# Patient Record
Sex: FEMALE | Race: WHITE | NOT HISPANIC OR LATINO | Employment: OTHER | URBAN - METROPOLITAN AREA
[De-identification: names, ages, dates, MRNs, and addresses within clinical notes are randomized per-mention and may not be internally consistent; named-entity substitution may affect disease eponyms.]

---

## 2023-02-16 PROBLEM — R10.2 PELVIC PAIN: Status: ACTIVE | Noted: 2023-02-16

## 2023-02-16 PROBLEM — R07.81 RIB PAIN ON RIGHT SIDE: Status: ACTIVE | Noted: 2023-02-16

## 2023-02-16 PROBLEM — K13.79 ORAL DYSPLASIA, ACQUIRED: Status: ACTIVE | Noted: 2023-02-16

## 2023-02-16 PROBLEM — K21.9 GERD WITHOUT ESOPHAGITIS: Status: ACTIVE | Noted: 2023-02-16

## 2023-02-16 PROBLEM — R91.1 INCIDENTAL LUNG NODULE, > 3MM AND < 8MM: Status: ACTIVE | Noted: 2023-02-16

## 2023-02-16 PROBLEM — E78.5 HYPERLIPIDEMIA: Status: ACTIVE | Noted: 2023-02-16

## 2023-02-16 PROBLEM — R11.0 NAUSEA: Status: ACTIVE | Noted: 2023-02-16

## 2023-02-16 PROBLEM — R10.9 RIGHT FLANK PAIN: Status: ACTIVE | Noted: 2023-02-16

## 2023-02-16 PROBLEM — R35.0 URINARY FREQUENCY: Status: ACTIVE | Noted: 2023-02-16

## 2023-02-16 PROBLEM — N83.2 CYST OF OVARY: Status: ACTIVE | Noted: 2023-02-16

## 2023-02-16 PROBLEM — R68.89 FLU-LIKE SYMPTOMS: Status: ACTIVE | Noted: 2023-02-16

## 2023-02-16 PROBLEM — R93.89 ULTRASOUND SCAN ABNORMAL: Status: ACTIVE | Noted: 2023-02-16

## 2023-02-16 PROBLEM — S32.020A COMPRESSION FRACTURE OF L2 LUMBAR VERTEBRA (MULTI): Status: ACTIVE | Noted: 2023-02-16

## 2023-02-16 PROBLEM — M51.36 DEGENERATIVE DISC DISEASE, LUMBAR: Status: ACTIVE | Noted: 2023-02-16

## 2023-02-16 PROBLEM — R42 DIZZINESS: Status: ACTIVE | Noted: 2023-02-16

## 2023-02-16 PROBLEM — N94.9 ADNEXAL CYST: Status: ACTIVE | Noted: 2023-02-16

## 2023-02-16 PROBLEM — L98.8 FACIAL RHYTIDS: Status: ACTIVE | Noted: 2023-02-16

## 2023-02-16 PROBLEM — M51.369 DEGENERATIVE DISC DISEASE, LUMBAR: Status: ACTIVE | Noted: 2023-02-16

## 2023-02-16 PROBLEM — J01.90 ACUTE SINUSITIS: Status: ACTIVE | Noted: 2023-02-16

## 2023-02-16 PROBLEM — S00.93XA HEAD CONTUSION: Status: ACTIVE | Noted: 2023-02-16

## 2023-02-16 PROBLEM — S69.90XA WRIST INJURY: Status: ACTIVE | Noted: 2023-02-16

## 2023-02-16 PROBLEM — M54.2 NECK PAIN: Status: ACTIVE | Noted: 2023-02-16

## 2023-02-16 PROBLEM — E03.9 HYPOTHYROIDISM: Status: ACTIVE | Noted: 2023-02-16

## 2023-02-16 PROBLEM — N39.0 ACUTE UTI: Status: ACTIVE | Noted: 2023-02-16

## 2023-02-16 PROBLEM — I73.00 RAYNAUD PHENOMENON: Status: ACTIVE | Noted: 2023-02-16

## 2023-02-16 PROBLEM — M79.641 PAIN IN BOTH HANDS: Status: ACTIVE | Noted: 2023-02-16

## 2023-02-16 PROBLEM — R10.30 LOWER ABDOMINAL PAIN: Status: ACTIVE | Noted: 2023-02-16

## 2023-02-16 PROBLEM — F41.9 ANXIETY: Status: ACTIVE | Noted: 2023-02-16

## 2023-02-16 PROBLEM — R07.89 ATYPICAL CHEST PAIN: Status: ACTIVE | Noted: 2023-02-16

## 2023-02-16 PROBLEM — H91.90 HEARING LOSS: Status: ACTIVE | Noted: 2023-02-16

## 2023-02-16 PROBLEM — D22.9 MULTIPLE NEVI: Status: ACTIVE | Noted: 2023-02-16

## 2023-02-16 PROBLEM — M79.642 PAIN IN BOTH HANDS: Status: ACTIVE | Noted: 2023-02-16

## 2023-02-16 PROBLEM — S99.921A INJURY OF RIGHT FOOT: Status: ACTIVE | Noted: 2023-02-16

## 2023-02-16 PROBLEM — K59.00 CONSTIPATION: Status: ACTIVE | Noted: 2023-02-16

## 2023-02-16 PROBLEM — N83.209 CYST OF OVARY: Status: ACTIVE | Noted: 2023-02-16

## 2023-02-16 PROBLEM — J02.9 SORE THROAT: Status: ACTIVE | Noted: 2023-02-16

## 2023-02-16 PROBLEM — L02.01: Status: ACTIVE | Noted: 2023-02-16

## 2023-02-16 PROBLEM — M54.50 LOWER BACK PAIN: Status: ACTIVE | Noted: 2023-02-16

## 2023-02-16 PROBLEM — M81.0 OSTEOPOROSIS: Status: ACTIVE | Noted: 2023-02-16

## 2023-02-16 PROBLEM — K63.5 COLON POLYP: Status: ACTIVE | Noted: 2023-02-16

## 2023-02-16 PROBLEM — R51.9 HEADACHE: Status: ACTIVE | Noted: 2023-02-16

## 2023-02-16 PROBLEM — J06.9 ACUTE UPPER RESPIRATORY INFECTION: Status: ACTIVE | Noted: 2023-02-16

## 2023-02-16 RX ORDER — BISACODYL 5 MG
1 TABLET, DELAYED RELEASE (ENTERIC COATED) ORAL DAILY PRN
COMMUNITY

## 2023-02-16 RX ORDER — LEVOTHYROXINE SODIUM 25 UG/1
1 TABLET ORAL DAILY
COMMUNITY
Start: 2015-04-30 | End: 2023-03-29 | Stop reason: SDUPTHER

## 2023-02-16 RX ORDER — POLYETHYLENE GLYCOL 3350 17 G/17G
POWDER, FOR SOLUTION ORAL DAILY
COMMUNITY
Start: 2013-12-02

## 2023-02-16 RX ORDER — METHOCARBAMOL 500 MG/1
1 TABLET, FILM COATED ORAL 3 TIMES DAILY PRN
COMMUNITY
End: 2023-10-03 | Stop reason: SDUPTHER

## 2023-02-16 RX ORDER — MELOXICAM 15 MG/1
15 TABLET ORAL DAILY
COMMUNITY
End: 2023-10-20 | Stop reason: SDUPTHER

## 2023-02-16 RX ORDER — SERTRALINE HYDROCHLORIDE 25 MG/1
1 TABLET, FILM COATED ORAL DAILY
COMMUNITY
Start: 2018-05-20 | End: 2023-03-29 | Stop reason: SDUPTHER

## 2023-02-16 RX ORDER — HYDROCODONE BITARTRATE AND ACETAMINOPHEN 5; 325 MG/1; MG/1
1 TABLET ORAL 3 TIMES DAILY
COMMUNITY
End: 2023-10-03 | Stop reason: ALTCHOICE

## 2023-02-16 RX ORDER — IBANDRONATE SODIUM 150 MG/1
1 TABLET, FILM COATED ORAL
COMMUNITY
End: 2023-03-29 | Stop reason: SDUPTHER

## 2023-03-29 ENCOUNTER — OFFICE VISIT (OUTPATIENT)
Dept: PRIMARY CARE | Facility: CLINIC | Age: 78
End: 2023-03-29
Payer: MEDICARE

## 2023-03-29 VITALS
BODY MASS INDEX: 21.38 KG/M2 | SYSTOLIC BLOOD PRESSURE: 120 MMHG | HEART RATE: 75 BPM | DIASTOLIC BLOOD PRESSURE: 86 MMHG | TEMPERATURE: 97.7 F | WEIGHT: 116.2 LBS | HEIGHT: 62 IN | OXYGEN SATURATION: 96 %

## 2023-03-29 DIAGNOSIS — S32.020D COMPRESSION FRACTURE OF L2 VERTEBRA WITH ROUTINE HEALING, SUBSEQUENT ENCOUNTER: Primary | ICD-10-CM

## 2023-03-29 DIAGNOSIS — E03.9 ACQUIRED HYPOTHYROIDISM: ICD-10-CM

## 2023-03-29 DIAGNOSIS — M81.0 OSTEOPOROSIS, UNSPECIFIED OSTEOPOROSIS TYPE, UNSPECIFIED PATHOLOGICAL FRACTURE PRESENCE: ICD-10-CM

## 2023-03-29 DIAGNOSIS — M51.36 DEGENERATIVE DISC DISEASE, LUMBAR: ICD-10-CM

## 2023-03-29 DIAGNOSIS — F41.9 ANXIETY: ICD-10-CM

## 2023-03-29 PROCEDURE — 1036F TOBACCO NON-USER: CPT | Performed by: INTERNAL MEDICINE

## 2023-03-29 PROCEDURE — 1159F MED LIST DOCD IN RCRD: CPT | Performed by: INTERNAL MEDICINE

## 2023-03-29 PROCEDURE — 99214 OFFICE O/P EST MOD 30 MIN: CPT | Performed by: INTERNAL MEDICINE

## 2023-03-29 PROCEDURE — 1160F RVW MEDS BY RX/DR IN RCRD: CPT | Performed by: INTERNAL MEDICINE

## 2023-03-29 RX ORDER — IBANDRONATE SODIUM 150 MG/1
150 TABLET, FILM COATED ORAL
Qty: 1 TABLET | Refills: 3 | Status: SHIPPED | OUTPATIENT
Start: 2023-03-29 | End: 2023-10-05 | Stop reason: ALTCHOICE

## 2023-03-29 RX ORDER — LEVOTHYROXINE SODIUM 25 UG/1
25 TABLET ORAL DAILY
Qty: 90 TABLET | Refills: 3 | Status: SHIPPED | OUTPATIENT
Start: 2023-03-29 | End: 2024-01-12 | Stop reason: SDUPTHER

## 2023-03-29 RX ORDER — GABAPENTIN 100 MG/1
100 CAPSULE ORAL 3 TIMES DAILY
Qty: 90 CAPSULE | Refills: 1 | Status: SHIPPED | OUTPATIENT
Start: 2023-03-29 | End: 2023-10-03

## 2023-03-29 RX ORDER — SERTRALINE HYDROCHLORIDE 25 MG/1
25 TABLET, FILM COATED ORAL DAILY
Qty: 90 TABLET | Refills: 3 | Status: SHIPPED | OUTPATIENT
Start: 2023-03-29 | End: 2023-10-03 | Stop reason: ALTCHOICE

## 2023-03-29 ASSESSMENT — PATIENT HEALTH QUESTIONNAIRE - PHQ9
1. LITTLE INTEREST OR PLEASURE IN DOING THINGS: NOT AT ALL
2. FEELING DOWN, DEPRESSED OR HOPELESS: NOT AT ALL
SUM OF ALL RESPONSES TO PHQ9 QUESTIONS 1 AND 2: 0

## 2023-03-29 NOTE — PROGRESS NOTES
Patient is here for a follow up. Patient says that she is still experiencing pain from broken vertebrae and would like to know if any testing is needed. Patient also would like to discuss osteoporosis medication options. Lastly, patient would like to discuss a new cholesterol medication since stopping the last one.

## 2023-03-29 NOTE — PROGRESS NOTES
"Subjective   Patient ID: Zoraida Mejia is a 77 y.o. female who presents for Follow-up, Discuss cholesterol medication, Continuous pain from broken vertebrae, and Discuss osteoporsis medication options .    Here because of recurrence and worsening of her LBP with radiation to both legs posterior aspect.  She had history of multilevel degenerative disc disease lumbar spine and L2 vertebral compression fracture few months ago,seen by [facility],ortho and pain management.  No urinary symptoms or incontinence.no legs weakness.       she is here to discuss her osteoporosis,was on Bisphosphonate for 5 years then on drug holiday,her DEXA showed more bone loss,she refused to start Prolia due to high cost and co pay,she is currently taking  Bisphosphonate.    she has ,HLD, depression,anxiety, hypothyroid,osteoporosis,tubular adenoma,GERD,h/o ovarian cyst,h/o kidney stone .taking her meds regularly,no side effects.                  Review of Systems   Reason unable to perform ROS: as HPI.       Objective   /86 (BP Location: Left arm, Patient Position: Sitting, BP Cuff Size: Adult)   Pulse 75   Temp 36.5 °C (97.7 °F) (Temporal)   Ht 1.568 m (5' 1.75\")   Wt 52.7 kg (116 lb 3.2 oz)   SpO2 96%   BMI 21.43 kg/m²     Physical Exam  Constitutional:       Appearance: Normal appearance.   HENT:      Head: Normocephalic and atraumatic.   Eyes:      Extraocular Movements: Extraocular movements intact.      Pupils: Pupils are equal, round, and reactive to light.   Cardiovascular:      Rate and Rhythm: Normal rate and regular rhythm.      Heart sounds: Normal heart sounds.   Pulmonary:      Effort: Pulmonary effort is normal.      Breath sounds: Normal breath sounds. No wheezing or rhonchi.   Abdominal:      General: Abdomen is flat. Bowel sounds are normal. There is no distension.      Palpations: Abdomen is soft.   Musculoskeletal:      Cervical back: Normal range of motion and neck supple.      Left lower leg: No edema.      " Comments: Pain over lower L spine and paraspinal area ,positive straight leg raising.   Skin:     General: Skin is warm.   Neurological:      General: No focal deficit present.      Mental Status: She is alert and oriented to person, place, and time.   Psychiatric:         Mood and Affect: Mood normal.         Behavior: Behavior normal.         Assessment/Plan   Problem List Items Addressed This Visit          Musculoskeletal    Osteoporosis    Relevant Medications    ibandronate (Boniva) 150 mg tablet    Other Relevant Orders    Referral to Rheumatology    Referral to Rheumatology    Compression fracture of L2 lumbar vertebra (CMS/HCC) - Primary    Relevant Orders    XR lumbar spine complete 4+ views    Referral to Rheumatology    Degenerative disc disease, lumbar    Relevant Medications    gabapentin (Neurontin) 100 mg capsule       Endocrine/Metabolic    Hypothyroidism    Relevant Medications    levothyroxine (Synthroid, Levoxyl) 25 mcg tablet       Other    Anxiety    Relevant Medications    sertraline (Zoloft) 25 mg tablet

## 2023-03-30 NOTE — PATIENT INSTRUCTIONS
Refer to Dr Higgins for osteoporosis treatment.  Refer back to orthopedic,pain management.  Order L spine XRAT to further assess her worsening pain.  Follow up in 3 months.  Continue othe rmeds as before.  Continuecalcium 600 mg twice daily,vitamin D3 1000 IU daily and follow weight bearing exercise for osteoporosis and fall prevention.

## 2023-05-03 LAB
CALCIDIOL (25 OH VITAMIN D3) (NG/ML) IN SER/PLAS: 34 NG/ML
RHEUMATOID FACTOR (IU/ML) IN SERUM OR PLASMA: <10 IU/ML (ref 0–15)
SEDIMENTATION RATE, ERYTHROCYTE: 5 MM/H (ref 0–30)

## 2023-05-04 LAB — ANTI-NUCLEAR ANTIBODY (ANA): NEGATIVE

## 2023-05-05 LAB
ALBUMIN ELP: 4.5 G/DL (ref 3.4–5)
ALPHA 1: 0.3 G/DL (ref 0.2–0.6)
ALPHA 2: 0.8 G/DL (ref 0.4–1.1)
BETA: 0.8 G/DL (ref 0.5–1.2)
GAMMA GLOBULIN: 1.3 G/DL (ref 0.5–1.4)
PATH REVIEW-SERUM PROTEIN ELECTROPHORESIS: NORMAL
PROTEIN ELECTROPHORESIS INTERPRETATION: NORMAL
PROTEIN TOTAL: 7.8 G/DL (ref 6.4–8.2)

## 2023-05-08 LAB — CITRULLINE ANTIBODY, IGG: 4 UNITS (ref 0–19)

## 2023-06-01 ENCOUNTER — HOSPITAL ENCOUNTER (OUTPATIENT)
Dept: DATA CONVERSION | Facility: HOSPITAL | Age: 78
End: 2023-06-01
Attending: PAIN MEDICINE | Admitting: PAIN MEDICINE
Payer: MEDICARE

## 2023-06-01 DIAGNOSIS — M54.16 RADICULOPATHY, LUMBAR REGION: ICD-10-CM

## 2023-06-01 LAB
ALANINE AMINOTRANSFERASE (SGPT) (U/L) IN SER/PLAS: 14 U/L (ref 7–45)
ALBUMIN (G/DL) IN SER/PLAS: 4.1 G/DL (ref 3.4–5)
ALKALINE PHOSPHATASE (U/L) IN SER/PLAS: 77 U/L (ref 33–136)
ANION GAP IN SER/PLAS: 12 MMOL/L (ref 10–20)
ASPARTATE AMINOTRANSFERASE (SGOT) (U/L) IN SER/PLAS: 20 U/L (ref 9–39)
BILIRUBIN TOTAL (MG/DL) IN SER/PLAS: 0.5 MG/DL (ref 0–1.2)
CALCIUM (MG/DL) IN SER/PLAS: 8.9 MG/DL (ref 8.6–10.3)
CARBON DIOXIDE, TOTAL (MMOL/L) IN SER/PLAS: 28 MMOL/L (ref 21–32)
CHLORIDE (MMOL/L) IN SER/PLAS: 102 MMOL/L (ref 98–107)
CREATININE (MG/DL) IN SER/PLAS: 0.87 MG/DL (ref 0.5–1.05)
GFR FEMALE: 68 ML/MIN/1.73M2
GLUCOSE (MG/DL) IN SER/PLAS: 77 MG/DL (ref 74–99)
POTASSIUM (MMOL/L) IN SER/PLAS: 4.4 MMOL/L (ref 3.5–5.3)
PROTEIN TOTAL: 7.3 G/DL (ref 6.4–8.2)
SODIUM (MMOL/L) IN SER/PLAS: 138 MMOL/L (ref 136–145)
UREA NITROGEN (MG/DL) IN SER/PLAS: 19 MG/DL (ref 6–23)

## 2023-07-19 PROBLEM — M25.50 ARTHRALGIA: Status: ACTIVE | Noted: 2023-07-19

## 2023-07-19 PROBLEM — M25.551 RIGHT HIP PAIN: Status: ACTIVE | Noted: 2023-07-19

## 2023-07-19 PROBLEM — M54.9 BACK PAIN: Status: ACTIVE | Noted: 2023-07-19

## 2023-07-19 PROBLEM — M54.16 LUMBAR RADICULAR PAIN: Status: ACTIVE | Noted: 2023-07-19

## 2023-07-19 PROBLEM — R10.9 RT FLANK PAIN: Status: ACTIVE | Noted: 2023-07-19

## 2023-07-19 PROBLEM — N83.299 OTHER OVARIAN CYST, UNSPECIFIED SIDE: Status: ACTIVE | Noted: 2023-07-19

## 2023-08-02 ENCOUNTER — APPOINTMENT (OUTPATIENT)
Dept: PRIMARY CARE | Facility: CLINIC | Age: 78
End: 2023-08-02
Payer: MEDICARE

## 2023-09-06 ENCOUNTER — TELEPHONE (OUTPATIENT)
Dept: PRIMARY CARE | Facility: CLINIC | Age: 78
End: 2023-09-06
Payer: MEDICARE

## 2023-09-06 DIAGNOSIS — K21.9 GERD WITHOUT ESOPHAGITIS: ICD-10-CM

## 2023-09-06 DIAGNOSIS — E03.9 ACQUIRED HYPOTHYROIDISM: ICD-10-CM

## 2023-09-06 DIAGNOSIS — E78.49 OTHER HYPERLIPIDEMIA: Primary | ICD-10-CM

## 2023-09-20 ENCOUNTER — LAB (OUTPATIENT)
Dept: LAB | Facility: LAB | Age: 78
End: 2023-09-20
Payer: MEDICARE

## 2023-09-20 DIAGNOSIS — E78.49 OTHER HYPERLIPIDEMIA: ICD-10-CM

## 2023-09-20 DIAGNOSIS — E03.9 ACQUIRED HYPOTHYROIDISM: ICD-10-CM

## 2023-09-20 DIAGNOSIS — K21.9 GERD WITHOUT ESOPHAGITIS: ICD-10-CM

## 2023-09-20 LAB
ALANINE AMINOTRANSFERASE (SGPT) (U/L) IN SER/PLAS: 18 U/L (ref 7–45)
ALBUMIN (G/DL) IN SER/PLAS: 4.2 G/DL (ref 3.4–5)
ALKALINE PHOSPHATASE (U/L) IN SER/PLAS: 68 U/L (ref 33–136)
ANION GAP IN SER/PLAS: 11 MMOL/L (ref 10–20)
ASPARTATE AMINOTRANSFERASE (SGOT) (U/L) IN SER/PLAS: 22 U/L (ref 9–39)
BASOPHILS (10*3/UL) IN BLOOD BY AUTOMATED COUNT: 0.05 X10E9/L (ref 0–0.1)
BASOPHILS/100 LEUKOCYTES IN BLOOD BY AUTOMATED COUNT: 0.6 % (ref 0–2)
BILIRUBIN TOTAL (MG/DL) IN SER/PLAS: 0.5 MG/DL (ref 0–1.2)
CALCIUM (MG/DL) IN SER/PLAS: 8.9 MG/DL (ref 8.6–10.3)
CARBON DIOXIDE, TOTAL (MMOL/L) IN SER/PLAS: 30 MMOL/L (ref 21–32)
CHLORIDE (MMOL/L) IN SER/PLAS: 102 MMOL/L (ref 98–107)
CHOLESTEROL (MG/DL) IN SER/PLAS: 239 MG/DL (ref 0–199)
CHOLESTEROL IN HDL (MG/DL) IN SER/PLAS: 71 MG/DL
CHOLESTEROL/HDL RATIO: 3.4
CREATININE (MG/DL) IN SER/PLAS: 0.84 MG/DL (ref 0.5–1.05)
EOSINOPHILS (10*3/UL) IN BLOOD BY AUTOMATED COUNT: 0.41 X10E9/L (ref 0–0.4)
EOSINOPHILS/100 LEUKOCYTES IN BLOOD BY AUTOMATED COUNT: 5.1 % (ref 0–6)
ERYTHROCYTE DISTRIBUTION WIDTH (RATIO) BY AUTOMATED COUNT: 13.6 % (ref 11.5–14.5)
ERYTHROCYTE MEAN CORPUSCULAR HEMOGLOBIN CONCENTRATION (G/DL) BY AUTOMATED: 32.5 G/DL (ref 32–36)
ERYTHROCYTE MEAN CORPUSCULAR VOLUME (FL) BY AUTOMATED COUNT: 94 FL (ref 80–100)
ERYTHROCYTES (10*6/UL) IN BLOOD BY AUTOMATED COUNT: 4.43 X10E12/L (ref 4–5.2)
GFR FEMALE: 71 ML/MIN/1.73M2
GLUCOSE (MG/DL) IN SER/PLAS: 95 MG/DL (ref 74–99)
HEMATOCRIT (%) IN BLOOD BY AUTOMATED COUNT: 41.6 % (ref 36–46)
HEMOGLOBIN (G/DL) IN BLOOD: 13.5 G/DL (ref 12–16)
IMMATURE GRANULOCYTES/100 LEUKOCYTES IN BLOOD BY AUTOMATED COUNT: 0.3 % (ref 0–0.9)
LDL: 138 MG/DL (ref 0–99)
LEUKOCYTES (10*3/UL) IN BLOOD BY AUTOMATED COUNT: 8 X10E9/L (ref 4.4–11.3)
LYMPHOCYTES (10*3/UL) IN BLOOD BY AUTOMATED COUNT: 3.81 X10E9/L (ref 0.8–3)
LYMPHOCYTES/100 LEUKOCYTES IN BLOOD BY AUTOMATED COUNT: 47.8 % (ref 13–44)
MONOCYTES (10*3/UL) IN BLOOD BY AUTOMATED COUNT: 0.59 X10E9/L (ref 0.05–0.8)
MONOCYTES/100 LEUKOCYTES IN BLOOD BY AUTOMATED COUNT: 7.4 % (ref 2–10)
NEUTROPHILS (10*3/UL) IN BLOOD BY AUTOMATED COUNT: 3.09 X10E9/L (ref 1.6–5.5)
NEUTROPHILS/100 LEUKOCYTES IN BLOOD BY AUTOMATED COUNT: 38.8 % (ref 40–80)
PLATELETS (10*3/UL) IN BLOOD AUTOMATED COUNT: 362 X10E9/L (ref 150–450)
POTASSIUM (MMOL/L) IN SER/PLAS: 4 MMOL/L (ref 3.5–5.3)
PROTEIN TOTAL: 7.4 G/DL (ref 6.4–8.2)
SODIUM (MMOL/L) IN SER/PLAS: 139 MMOL/L (ref 136–145)
THYROTROPIN (MIU/L) IN SER/PLAS BY DETECTION LIMIT <= 0.05 MIU/L: 1.5 MIU/L (ref 0.44–3.98)
TRIGLYCERIDE (MG/DL) IN SER/PLAS: 150 MG/DL (ref 0–149)
UREA NITROGEN (MG/DL) IN SER/PLAS: 16 MG/DL (ref 6–23)
VLDL: 30 MG/DL (ref 0–40)

## 2023-09-20 PROCEDURE — 85025 COMPLETE CBC W/AUTO DIFF WBC: CPT

## 2023-09-20 PROCEDURE — 80053 COMPREHEN METABOLIC PANEL: CPT

## 2023-09-20 PROCEDURE — 80061 LIPID PANEL: CPT

## 2023-09-20 PROCEDURE — 36415 COLL VENOUS BLD VENIPUNCTURE: CPT

## 2023-09-20 PROCEDURE — 84443 ASSAY THYROID STIM HORMONE: CPT

## 2023-09-21 NOTE — RESULT ENCOUNTER NOTE
Lab show elevated cholesterol,rest of lab results within normal range,follow low fat diet and we will discuss at upcoming appointment.

## 2023-10-02 NOTE — OP NOTE
PROCEDURE DETAILS      Postoperative Diagnosis:  Lumbar radiculopathy  Surgeon: Juju Huffman  Resident/Fellow/Other Assistant: Fara Alonso    Procedure:  Lumbar epidural steroid injection under fluoroscopic guidance     Anesthesia: No anesthesiologist associated with this case  Estimated Blood Loss: 0  Findings: None         Operative Report:       Operation  Midline lumbar epidural steroid injection. Level performed L5-S1    Surgical indications  The patient is here today to receive an epidural steroid injection to assist with pain.    Operative report  The patient was taken to the procedure room, was placed into a prone positioning. The lumbar area was prepped and draped sterilely in the normal fashion. Under fluoroscopic guidance the epidural space was identified. The skin and subcutaneous tissue was  anesthetized with 1% lidocaine. An 18-gauge Tuohy needle was introduced using the normal saline loss-of-resistance technique. Once the loss of resistance had been achieved, final positioning of the needle was confirmed with negative aspiration of heme  and CSF, with the injection of contrast material showing spread in the epidural space, confirmed in AP and lateral view. Then the patient received 80 mg of Depo-Medrol diluted into normal saline preservative-free and 2 mL of 0.25% bupivacaine making total  volume of 8 mL. The patient tolerated the procedure well, was taken to the recovery room, allowed to recover sufficient amount of time and then was discharged home in stable condition. The patient was advised to followup with [facility]  to reassess improvement on as-needed basis.    Thank you for allowing me to participate in the care of this patient.                        Attestation:   Note Completion:  Attending Attestation I performed the procedure without a resident         Electronic Signatures:  Juju Huffman)  (Signed 01-Jun-2023 10:59)   Authored: Post-Operative Note,  Chart Review, Note Completion      Last Updated: 01-Jun-2023 10:59 by Juju Huffman)

## 2023-10-03 ENCOUNTER — TELEPHONE (OUTPATIENT)
Dept: PRIMARY CARE | Facility: CLINIC | Age: 78
End: 2023-10-03

## 2023-10-03 ENCOUNTER — OFFICE VISIT (OUTPATIENT)
Dept: PRIMARY CARE | Facility: CLINIC | Age: 78
End: 2023-10-03
Payer: MEDICARE

## 2023-10-03 VITALS
HEIGHT: 61 IN | DIASTOLIC BLOOD PRESSURE: 90 MMHG | WEIGHT: 118.8 LBS | OXYGEN SATURATION: 97 % | BODY MASS INDEX: 22.43 KG/M2 | HEART RATE: 79 BPM | SYSTOLIC BLOOD PRESSURE: 148 MMHG | TEMPERATURE: 97.4 F | RESPIRATION RATE: 16 BRPM

## 2023-10-03 DIAGNOSIS — M54.41 CHRONIC RIGHT-SIDED LOW BACK PAIN WITH RIGHT-SIDED SCIATICA: ICD-10-CM

## 2023-10-03 DIAGNOSIS — K63.5 POLYP OF COLON, UNSPECIFIED PART OF COLON, UNSPECIFIED TYPE: ICD-10-CM

## 2023-10-03 DIAGNOSIS — E78.5 HYPERLIPIDEMIA, UNSPECIFIED HYPERLIPIDEMIA TYPE: ICD-10-CM

## 2023-10-03 DIAGNOSIS — Z23 VACCINE FOR DIPHTHERIA-TETANUS: ICD-10-CM

## 2023-10-03 DIAGNOSIS — F41.9 ANXIETY: ICD-10-CM

## 2023-10-03 DIAGNOSIS — Z00.00 ROUTINE GENERAL MEDICAL EXAMINATION AT HEALTH CARE FACILITY: Primary | ICD-10-CM

## 2023-10-03 DIAGNOSIS — G89.29 CHRONIC RIGHT-SIDED LOW BACK PAIN WITH RIGHT-SIDED SCIATICA: ICD-10-CM

## 2023-10-03 DIAGNOSIS — M54.2 NECK PAIN: Primary | ICD-10-CM

## 2023-10-03 DIAGNOSIS — K59.00 CONSTIPATION, UNSPECIFIED CONSTIPATION TYPE: ICD-10-CM

## 2023-10-03 DIAGNOSIS — Z00.00 MEDICARE ANNUAL WELLNESS VISIT, SUBSEQUENT: ICD-10-CM

## 2023-10-03 DIAGNOSIS — S91.302A WOUND OF LEFT FOOT: ICD-10-CM

## 2023-10-03 DIAGNOSIS — S91.332A PENETRATING FOOT WOUND, LEFT, INITIAL ENCOUNTER: ICD-10-CM

## 2023-10-03 PROBLEM — L90.5 SCAR CONDITION AND FIBROSIS OF SKIN: Status: ACTIVE | Noted: 2023-06-16

## 2023-10-03 PROBLEM — D22.5 MELANOCYTIC NEVI OF TRUNK: Status: ACTIVE | Noted: 2023-06-16

## 2023-10-03 PROBLEM — H90.3 BILATERAL HIGH FREQUENCY SENSORINEURAL HEARING LOSS: Status: ACTIVE | Noted: 2023-10-03

## 2023-10-03 PROBLEM — H61.22 IMPACTED CERUMEN OF LEFT EAR: Status: ACTIVE | Noted: 2023-10-03

## 2023-10-03 PROBLEM — H92.02 OTALGIA OF LEFT EAR: Status: ACTIVE | Noted: 2023-10-03

## 2023-10-03 PROBLEM — D22.39 MELANOCYTIC NEVI OF OTHER PARTS OF FACE: Status: ACTIVE | Noted: 2023-06-16

## 2023-10-03 PROBLEM — L82.1 OTHER SEBORRHEIC KERATOSIS: Status: ACTIVE | Noted: 2023-06-16

## 2023-10-03 PROBLEM — L81.4 OTHER MELANIN HYPERPIGMENTATION: Status: ACTIVE | Noted: 2023-06-16

## 2023-10-03 PROBLEM — D22.71 MELANOCYTIC NEVI OF RIGHT LOWER LIMB, INCLUDING HIP: Status: ACTIVE | Noted: 2023-06-16

## 2023-10-03 PROCEDURE — 90714 TD VACC NO PRESV 7 YRS+ IM: CPT | Performed by: INTERNAL MEDICINE

## 2023-10-03 PROCEDURE — 1159F MED LIST DOCD IN RCRD: CPT | Performed by: INTERNAL MEDICINE

## 2023-10-03 PROCEDURE — 93000 ELECTROCARDIOGRAM COMPLETE: CPT | Performed by: INTERNAL MEDICINE

## 2023-10-03 PROCEDURE — 99214 OFFICE O/P EST MOD 30 MIN: CPT | Performed by: INTERNAL MEDICINE

## 2023-10-03 PROCEDURE — 1125F AMNT PAIN NOTED PAIN PRSNT: CPT | Performed by: INTERNAL MEDICINE

## 2023-10-03 PROCEDURE — 1170F FXNL STATUS ASSESSED: CPT | Performed by: INTERNAL MEDICINE

## 2023-10-03 PROCEDURE — G0444 DEPRESSION SCREEN ANNUAL: HCPCS | Performed by: INTERNAL MEDICINE

## 2023-10-03 PROCEDURE — G0442 ANNUAL ALCOHOL SCREEN 15 MIN: HCPCS | Performed by: INTERNAL MEDICINE

## 2023-10-03 PROCEDURE — G0439 PPPS, SUBSEQ VISIT: HCPCS | Performed by: INTERNAL MEDICINE

## 2023-10-03 PROCEDURE — 90471 IMMUNIZATION ADMIN: CPT | Performed by: INTERNAL MEDICINE

## 2023-10-03 PROCEDURE — 1160F RVW MEDS BY RX/DR IN RCRD: CPT | Performed by: INTERNAL MEDICINE

## 2023-10-03 PROCEDURE — 1036F TOBACCO NON-USER: CPT | Performed by: INTERNAL MEDICINE

## 2023-10-03 RX ORDER — ROSUVASTATIN CALCIUM 5 MG/1
5 TABLET, COATED ORAL DAILY
Qty: 30 TABLET | Refills: 5 | Status: SHIPPED | OUTPATIENT
Start: 2023-10-03 | End: 2024-01-12 | Stop reason: SDUPTHER

## 2023-10-03 RX ORDER — CYCLOBENZAPRINE HCL 5 MG
5 TABLET ORAL NIGHTLY
COMMUNITY
End: 2023-10-03 | Stop reason: SDUPTHER

## 2023-10-03 RX ORDER — CYCLOBENZAPRINE HCL 5 MG
5 TABLET ORAL NIGHTLY
Qty: 30 TABLET | Refills: 2 | Status: SHIPPED | OUTPATIENT
Start: 2023-10-03 | End: 2023-12-26

## 2023-10-03 RX ORDER — METHOCARBAMOL 500 MG/1
500 TABLET, FILM COATED ORAL 3 TIMES DAILY PRN
Qty: 90 TABLET | Refills: 2 | Status: SHIPPED | OUTPATIENT
Start: 2023-10-03 | End: 2023-10-03

## 2023-10-03 RX ORDER — CYCLOBENZAPRINE HCL 5 MG
5 TABLET ORAL 3 TIMES DAILY PRN
Qty: 30 TABLET | Refills: 0 | Status: SHIPPED | OUTPATIENT
Start: 2023-10-03 | End: 2023-11-03

## 2023-10-03 RX ORDER — HYDROXYZINE HYDROCHLORIDE 25 MG/1
25 TABLET, FILM COATED ORAL 2 TIMES DAILY
Qty: 60 TABLET | Refills: 0 | Status: SHIPPED | OUTPATIENT
Start: 2023-10-03 | End: 2023-10-20

## 2023-10-03 ASSESSMENT — ENCOUNTER SYMPTOMS
GASTROINTESTINAL NEGATIVE: 1
EYES NEGATIVE: 1
DEPRESSION: 0
NERVOUS/ANXIOUS: 1
OCCASIONAL FEELINGS OF UNSTEADINESS: 0
RESPIRATORY NEGATIVE: 1
NEUROLOGICAL NEGATIVE: 1
LOSS OF SENSATION IN FEET: 0
BACK PAIN: 1
CONSTITUTIONAL NEGATIVE: 1
HEMATOLOGIC/LYMPHATIC NEGATIVE: 1
SLEEP DISTURBANCE: 1
CARDIOVASCULAR NEGATIVE: 1

## 2023-10-03 ASSESSMENT — ACTIVITIES OF DAILY LIVING (ADL)
DRESSING: INDEPENDENT
GROCERY_SHOPPING: INDEPENDENT
BATHING: INDEPENDENT
TAKING_MEDICATION: INDEPENDENT
DOING_HOUSEWORK: INDEPENDENT
MANAGING_FINANCES: INDEPENDENT

## 2023-10-03 ASSESSMENT — PATIENT HEALTH QUESTIONNAIRE - PHQ9
SUM OF ALL RESPONSES TO PHQ9 QUESTIONS 1 AND 2: 0
2. FEELING DOWN, DEPRESSED OR HOPELESS: NOT AT ALL
1. LITTLE INTEREST OR PLEASURE IN DOING THINGS: NOT AT ALL

## 2023-10-03 NOTE — TELEPHONE ENCOUNTER
Patient was seen this morning and says that she asked if you can prescribe her Cyclobenzaprine that was originally from another doctor but you renewed 2 other ones instead. Please prescribe for patient.  Patient uses the [facility] of [address].

## 2023-10-03 NOTE — PROGRESS NOTES
"Subjective   Patient ID: Zoraida Mejia is a 78 y.o. female who presents for Medicare Annual Wellness Visit Subsequent.    Here for MCR and follow up on medical problems.  she has ,HLD, depression,anxiety, hypothyroid,osteoporosis,tubular adenoma,GERD,h/o ovarian cyst,h/o kidney stone .taking her meds regularly,no side effects.She also has osteoporosis,was on Bisphosphonate for 5 years then on drug holiday,her DEXA showed more bone loss,she refused to start Prolia due to high cost and co pay,but she is currently on IV Reclast and was covered.  She has a wound on her leg from her garden.  She also has her usual  LBP with radiation to both legs posterior aspect.  She had history of multilevel degenerative disc disease lumbar spine and L2 vertebral compression fracture few months ago,seen by [facility],ortho and pain management.she saw ortho,pain management,,had 1 nerve block with some improvement but it came back and has an appt with Dr Masters.  No urinary symptoms or incontinence.no legs weakness.  She denies depression,she feels anxious,she has 1-2 beers per day.                  Review of Systems   Constitutional: Negative.    HENT: Negative.     Eyes: Negative.    Respiratory: Negative.     Cardiovascular: Negative.    Gastrointestinal: Negative.    Genitourinary: Negative.    Musculoskeletal:  Positive for back pain.   Skin:  Positive for wound.   Neurological: Negative.    Hematological: Negative.    Psychiatric/Behavioral:  Positive for sleep disturbance. The patient is nervous/anxious.        Objective   /90 (BP Location: Left arm, Patient Position: Sitting, BP Cuff Size: Adult)   Pulse 79   Temp 36.3 °C (97.4 °F)   Resp 16   Ht 1.549 m (5' 1\")   Wt 53.9 kg (118 lb 12.8 oz)   SpO2 97%   BMI 22.45 kg/m²     Physical Exam  Constitutional:       Appearance: Normal appearance.   HENT:      Head: Normocephalic and atraumatic.   Eyes:      Extraocular Movements: Extraocular movements intact.      Pupils: " Pupils are equal, round, and reactive to light.   Cardiovascular:      Rate and Rhythm: Normal rate and regular rhythm.      Heart sounds: Normal heart sounds.   Pulmonary:      Effort: Pulmonary effort is normal.      Breath sounds: Normal breath sounds. No wheezing or rhonchi.   Abdominal:      General: Abdomen is flat. Bowel sounds are normal. There is no distension.      Palpations: Abdomen is soft.   Musculoskeletal:         General: Normal range of motion.      Cervical back: Normal range of motion and neck supple.      Right lower leg: No edema.      Left lower leg: No edema.   Skin:     General: Skin is warm.      Comments: Lean abrasion left lower leg,no signs of infection.   Neurological:      General: No focal deficit present.      Mental Status: She is alert and oriented to person, place, and time.   Psychiatric:         Mood and Affect: Mood normal.         Behavior: Behavior normal.         Assessment/Plan   Problem List Items Addressed This Visit             ICD-10-CM    Anxiety F41.9     Start Hydroxyzine PRN.         Colon polyp K63.5    Relevant Orders    Colonoscopy Screening    Hyperlipidemia E78.5     Restart Crestor and recheck labs in 3 months.         Relevant Medications    hydrOXYzine HCL (Atarax) 25 mg tablet    rosuvastatin (Crestor) 5 mg tablet    Other Relevant Orders    ECG 12 lead (Clinic Performed) (Completed)    Lipid Panel    Hepatic Function Panel    Creatine Kinase    Lower back pain M54.50    Constipation K59.00     Has been on laxative with some response she did not respond to Miralax or fiber supplement.         Medicare annual wellness visit, subsequent Z00.00     EKG done today.  Fall prevention.  Order colonoscopy.         Wound of left foot S91.302A     Local wound care,update Td.         Relevant Orders    Td vaccine, age 7 years and older (TENIVAC) (Completed)     Other Visit Diagnoses         Codes    Routine general medical examination at health care facility    -   Primary Z00.00    Vaccine for diphtheria-tetanus     Z23    Penetrating foot wound, left, initial encounter     S91.332A    Relevant Orders    Td vaccine, age 7 years and older (TENIVAC) (Completed)

## 2023-10-05 ASSESSMENT — ENCOUNTER SYMPTOMS: WOUND: 1

## 2023-10-20 ENCOUNTER — TELEPHONE (OUTPATIENT)
Dept: PAIN MEDICINE | Facility: CLINIC | Age: 78
End: 2023-10-20
Payer: MEDICARE

## 2023-10-20 DIAGNOSIS — M54.50 CHRONIC LOW BACK PAIN, UNSPECIFIED BACK PAIN LATERALITY, UNSPECIFIED WHETHER SCIATICA PRESENT: Primary | ICD-10-CM

## 2023-10-20 DIAGNOSIS — G89.29 CHRONIC LOW BACK PAIN, UNSPECIFIED BACK PAIN LATERALITY, UNSPECIFIED WHETHER SCIATICA PRESENT: Primary | ICD-10-CM

## 2023-10-20 DIAGNOSIS — E78.5 HYPERLIPIDEMIA, UNSPECIFIED HYPERLIPIDEMIA TYPE: ICD-10-CM

## 2023-10-20 RX ORDER — MELOXICAM 15 MG/1
15 TABLET ORAL DAILY
Qty: 30 TABLET | Refills: 5 | Status: SHIPPED | OUTPATIENT
Start: 2023-10-20 | End: 2023-10-24 | Stop reason: SDUPTHER

## 2023-10-20 RX ORDER — HYDROXYZINE HYDROCHLORIDE 25 MG/1
25 TABLET, FILM COATED ORAL 2 TIMES DAILY
Qty: 60 TABLET | Refills: 0 | Status: SHIPPED | OUTPATIENT
Start: 2023-10-20 | End: 2023-10-25

## 2023-10-24 ENCOUNTER — PHARMACY VISIT (OUTPATIENT)
Dept: PHARMACY | Facility: CLINIC | Age: 78
End: 2023-10-24
Payer: MEDICARE

## 2023-10-24 DIAGNOSIS — M54.50 CHRONIC LOW BACK PAIN, UNSPECIFIED BACK PAIN LATERALITY, UNSPECIFIED WHETHER SCIATICA PRESENT: ICD-10-CM

## 2023-10-24 DIAGNOSIS — G89.29 CHRONIC LOW BACK PAIN, UNSPECIFIED BACK PAIN LATERALITY, UNSPECIFIED WHETHER SCIATICA PRESENT: ICD-10-CM

## 2023-10-24 RX ORDER — MELOXICAM 15 MG/1
15 TABLET ORAL DAILY
Qty: 30 TABLET | Refills: 5 | Status: SHIPPED | OUTPATIENT
Start: 2023-10-24 | End: 2024-01-12 | Stop reason: ALTCHOICE

## 2023-10-25 DIAGNOSIS — E78.5 HYPERLIPIDEMIA, UNSPECIFIED HYPERLIPIDEMIA TYPE: ICD-10-CM

## 2023-10-25 RX ORDER — HYDROXYZINE HYDROCHLORIDE 25 MG/1
25 TABLET, FILM COATED ORAL 2 TIMES DAILY
Qty: 180 TABLET | Refills: 1 | Status: SHIPPED | OUTPATIENT
Start: 2023-10-25 | End: 2024-04-15 | Stop reason: SDUPTHER

## 2023-11-01 ENCOUNTER — APPOINTMENT (OUTPATIENT)
Dept: ORTHOPEDIC SURGERY | Facility: CLINIC | Age: 78
End: 2023-11-01
Payer: MEDICARE

## 2023-11-03 ENCOUNTER — OFFICE VISIT (OUTPATIENT)
Dept: CARDIOLOGY | Facility: CLINIC | Age: 78
End: 2023-11-03
Payer: MEDICARE

## 2023-11-03 VITALS
BODY MASS INDEX: 22.09 KG/M2 | HEIGHT: 61 IN | OXYGEN SATURATION: 96 % | HEART RATE: 90 BPM | SYSTOLIC BLOOD PRESSURE: 149 MMHG | WEIGHT: 117 LBS | DIASTOLIC BLOOD PRESSURE: 89 MMHG

## 2023-11-03 DIAGNOSIS — I73.00 RAYNAUD'S PHENOMENON WITHOUT GANGRENE: Primary | ICD-10-CM

## 2023-11-03 DIAGNOSIS — I10 PRIMARY HYPERTENSION: ICD-10-CM

## 2023-11-03 PROBLEM — J01.90 ACUTE SINUSITIS: Status: RESOLVED | Noted: 2023-02-16 | Resolved: 2023-11-03

## 2023-11-03 PROBLEM — S99.921A INJURY OF RIGHT FOOT: Status: RESOLVED | Noted: 2023-02-16 | Resolved: 2023-11-03

## 2023-11-03 PROBLEM — M79.642 PAIN IN BOTH HANDS: Status: RESOLVED | Noted: 2023-02-16 | Resolved: 2023-11-03

## 2023-11-03 PROBLEM — S00.93XA HEAD CONTUSION: Status: RESOLVED | Noted: 2023-02-16 | Resolved: 2023-11-03

## 2023-11-03 PROBLEM — J02.9 SORE THROAT: Status: RESOLVED | Noted: 2023-02-16 | Resolved: 2023-11-03

## 2023-11-03 PROBLEM — R11.0 NAUSEA: Status: RESOLVED | Noted: 2023-02-16 | Resolved: 2023-11-03

## 2023-11-03 PROBLEM — S91.302A WOUND OF LEFT FOOT: Status: RESOLVED | Noted: 2023-10-03 | Resolved: 2023-11-03

## 2023-11-03 PROBLEM — R07.89 ATYPICAL CHEST PAIN: Status: RESOLVED | Noted: 2023-02-16 | Resolved: 2023-11-03

## 2023-11-03 PROBLEM — N39.0 ACUTE UTI: Status: RESOLVED | Noted: 2023-02-16 | Resolved: 2023-11-03

## 2023-11-03 PROBLEM — R42 DIZZINESS: Status: RESOLVED | Noted: 2023-02-16 | Resolved: 2023-11-03

## 2023-11-03 PROBLEM — M54.2 NECK PAIN: Status: RESOLVED | Noted: 2023-02-16 | Resolved: 2023-11-03

## 2023-11-03 PROBLEM — R68.89 FLU-LIKE SYMPTOMS: Status: RESOLVED | Noted: 2023-02-16 | Resolved: 2023-11-03

## 2023-11-03 PROBLEM — J06.9 ACUTE UPPER RESPIRATORY INFECTION: Status: RESOLVED | Noted: 2023-02-16 | Resolved: 2023-11-03

## 2023-11-03 PROBLEM — R35.0 URINARY FREQUENCY: Status: RESOLVED | Noted: 2023-02-16 | Resolved: 2023-11-03

## 2023-11-03 PROBLEM — L02.01: Status: RESOLVED | Noted: 2023-02-16 | Resolved: 2023-11-03

## 2023-11-03 PROBLEM — R93.89 ULTRASOUND SCAN ABNORMAL: Status: RESOLVED | Noted: 2023-02-16 | Resolved: 2023-11-03

## 2023-11-03 PROBLEM — R51.9 HEADACHE: Status: RESOLVED | Noted: 2023-02-16 | Resolved: 2023-11-03

## 2023-11-03 PROBLEM — M79.641 PAIN IN BOTH HANDS: Status: RESOLVED | Noted: 2023-02-16 | Resolved: 2023-11-03

## 2023-11-03 PROBLEM — M54.9 BACK PAIN: Status: RESOLVED | Noted: 2023-07-19 | Resolved: 2023-11-03

## 2023-11-03 PROBLEM — R07.81 RIB PAIN ON RIGHT SIDE: Status: RESOLVED | Noted: 2023-02-16 | Resolved: 2023-11-03

## 2023-11-03 PROBLEM — S69.90XA WRIST INJURY: Status: RESOLVED | Noted: 2023-02-16 | Resolved: 2023-11-03

## 2023-11-03 PROCEDURE — 1160F RVW MEDS BY RX/DR IN RCRD: CPT | Performed by: INTERNAL MEDICINE

## 2023-11-03 PROCEDURE — 1036F TOBACCO NON-USER: CPT | Performed by: INTERNAL MEDICINE

## 2023-11-03 PROCEDURE — 93005 ELECTROCARDIOGRAM TRACING: CPT | Mod: PO | Performed by: INTERNAL MEDICINE

## 2023-11-03 PROCEDURE — 99203 OFFICE O/P NEW LOW 30 MIN: CPT | Performed by: INTERNAL MEDICINE

## 2023-11-03 PROCEDURE — 1126F AMNT PAIN NOTED NONE PRSNT: CPT | Performed by: INTERNAL MEDICINE

## 2023-11-03 PROCEDURE — 93010 ELECTROCARDIOGRAM REPORT: CPT | Performed by: INTERNAL MEDICINE

## 2023-11-03 PROCEDURE — 99213 OFFICE O/P EST LOW 20 MIN: CPT | Mod: PO | Performed by: INTERNAL MEDICINE

## 2023-11-03 PROCEDURE — 3079F DIAST BP 80-89 MM HG: CPT | Performed by: INTERNAL MEDICINE

## 2023-11-03 PROCEDURE — 1159F MED LIST DOCD IN RCRD: CPT | Performed by: INTERNAL MEDICINE

## 2023-11-03 PROCEDURE — 3077F SYST BP >= 140 MM HG: CPT | Performed by: INTERNAL MEDICINE

## 2023-11-03 RX ORDER — LISINOPRIL 2.5 MG/1
2.5 TABLET ORAL DAILY
Qty: 30 TABLET | Refills: 11 | Status: SHIPPED | OUTPATIENT
Start: 2023-11-03 | End: 2024-01-12 | Stop reason: SDUPTHER

## 2023-11-03 ASSESSMENT — PAIN SCALES - GENERAL: PAINLEVEL: 0-NO PAIN

## 2023-11-03 ASSESSMENT — ENCOUNTER SYMPTOMS
DEPRESSION: 1
LOSS OF SENSATION IN FEET: 0
OCCASIONAL FEELINGS OF UNSTEADINESS: 0

## 2023-11-03 NOTE — PROGRESS NOTES
Name : Zoraida Mejia    : 1945   MRN : [id number]   ENC Date : 23     Reason for visit:   Hypertension:    Assessment and Plan:   Hypertension: Patient at the request of primary care physician was checking her blood pressure at home.  Many of her blood pressures are perfectly normal.  None of them are low.  However she did have several that were greater than 150 and a few greater than 160 systolic and a handful of diastolic pressures greater than 90 and 1 or 2 greater than 100.  She is asymptomatic.  I recommended given the number of blood pressures that are elevated that we add lisinopril 2.5 mg to her medical regimen.  I explained the risks of cough and angioedema.  She will follow-up with her primary care physician for further titration of her medications.  Dyslipidemia: Patient's lipids checked earlier this year were off rosuvastatin.  She was started back on rosuvastatin 5 mg daily.  She seems to be tolerating it reasonably well.  Her prior intolerance really was not related to the statin.  I explained that to her.  It is likely she will need 10 or 20 mg of rosuvastatin to get to goal.  This will be titrated by her primary care physician.  Cardiac risk: We considered a coronary calcium score for risk stratification.  However patient is already going to be on a statin so there is a little difference in decision making we would make based on her coronary calcium score.  She is asymptomatic so stress testing is not appropriate.  Therefore we decided not to order this test and simply go ahead with statin therapy.  Disp:  Return on an as-needed basis.      HPI:    Patient was seen today at her request due to elevated blood pressure.  Her primary care physician asked her to check her blood pressures at home and many of them are normal however several are elevated particularly in the evening hours.  A few diastolics are greater than 100.  Several systolic pressures are greater than 140.  Her daughter is  a pediatric oncology nurse and recommended she see cardiology quickly due to the elevated blood pressures.  Fortunately patient has no symptoms.  She has no chest discomfort.  No dyspnea with exertion.  No orthopnea nor PND.  No syncopal events.  She otherwise feels quite well.  She had 1 episode of possible rainout symptoms in the past.  Her fingertips turn blue.  This actually was not associated with exposure to cold.  She does not have any color changes during the winter or when she puts her hand in the refrigerator.  It is a little unclear whether she actually has Raynaud's.  I considered using amlodipine and I probably would have chosen this as she truly had significant Raynaud's but since she does not I elected to go with an ACE inhibitor for blood pressure control given the better vascular outcomes with ACE inhibitors.  It is possible she might need both at some point in the future.       Problem List:   Patient Active Problem List   Diagnosis    Anxiety    Colon polyp    Cyst of ovary    Facial rhytids    GERD without esophagitis    Hearing loss    Hyperlipidemia    Hypothyroidism    Incidental lung nodule, > 3mm and < 8mm    Lower back pain    Multiple nevi    Constipation    Oral dysplasia, acquired    Osteoporosis    Pelvic pain    Raynaud phenomenon    Right flank pain    Adnexal cyst    Compression fracture of L2 lumbar vertebra (CMS/HCC)    Degenerative disc disease, lumbar    Lower abdominal pain    Other ovarian cyst, unspecified side    Arthralgia    Lumbar radicular pain    Right hip pain    Rt flank pain    Bilateral high frequency sensorineural hearing loss    Impacted cerumen of left ear    Melanocytic nevi of right lower limb, including hip    Melanocytic nevi of trunk    Melanocytic nevi of other parts of face    Otalgia of left ear    Other melanin hyperpigmentation    Other seborrheic keratosis    Scar condition and fibrosis of skin    Medicare annual wellness visit, subsequent        Meds:    Current Outpatient Medications on File Prior to Visit   Medication Sig Dispense Refill    bisacodyl (Dulcolax) 5 mg EC tablet Take 1 tablet (5 mg) by mouth once daily as needed.      cyclobenzaprine (Flexeril) 5 mg tablet Take 1 tablet (5 mg) by mouth once daily at bedtime. 1 TABLET AT BEDTIME PRN FOR LOW BACK ACHES 30 tablet 2    hydrOXYzine HCL (Atarax) 25 mg tablet TAKE 1 TABLET BY MOUTH TWICE A  tablet 1    levothyroxine (Synthroid, Levoxyl) 25 mcg tablet Take 1 tablet (25 mcg) by mouth once daily. 90 tablet 3    meloxicam (Mobic) 15 mg tablet Take 1 tablet (15 mg) by mouth once daily. 30 tablet 5    polyethylene glycol (Glycolax) 17 gram/dose powder Take by mouth once daily. MIX 1 CAPFUL (17GM) IN 8 OUNCES OF WATER, JUICE, OR TEA AND DRINK DAILY.      rosuvastatin (Crestor) 5 mg tablet Take 1 tablet (5 mg) by mouth once daily. 30 tablet 5    [DISCONTINUED] cyclobenzaprine (Flexeril) 5 mg tablet Take 1 tablet (5 mg) by mouth 3 times a day as needed for muscle spasms. (Patient not taking: Reported on 11/3/2023) 30 tablet 0     No current facility-administered medications on file prior to visit.       All:   Allergies   Allergen Reactions    Rosuvastatin Other     Muscle aches  UTI        Fam Hx:   Family History   Problem Relation Name Age of Onset    Heart disease Mother          cardiac disorder       Soc Hx:   Social History     Socioeconomic History    Marital status:      Spouse name: Not on file    Number of children: 4    Years of education: Not on file    Highest education level: Not on file   Occupational History    Not on file   Tobacco Use    Smoking status: Never     Passive exposure: Past    Smokeless tobacco: Never   Vaping Use    Vaping Use: Never used   Substance and Sexual Activity    Alcohol use: Yes    Drug use: Never    Sexual activity: Not on file   Other Topics Concern    Not on file   Social History Narrative    Not on file     Social Determinants of Health     Financial  "Resource Strain: Not on file   Food Insecurity: Not on file   Transportation Needs: Not on file   Physical Activity: Not on file   Stress: Not on file   Social Connections: Not on file   Intimate Partner Violence: Not on file   Housing Stability: Not on file       ROS    VS: /89 (BP Location: Right arm, Patient Position: Sitting)   Pulse 90   Ht 1.549 m (5' 1\")   Wt 53.1 kg (117 lb)   SpO2 96%   BMI 22.11 kg/m²      Physical Exam  Vitals reviewed.   Constitutional:       Appearance: Normal appearance.   Eyes:      Pupils: Pupils are equal, round, and reactive to light.   Neck:      Vascular: No JVD.   Cardiovascular:      Rate and Rhythm: Normal rate and regular rhythm.      Pulses: Normal pulses.      Heart sounds: No murmur heard.     No gallop.   Pulmonary:      Effort: No respiratory distress.      Breath sounds: No wheezing or rales.   Abdominal:      General: Abdomen is flat. There is no distension.      Palpations: Abdomen is soft.   Musculoskeletal:         General: No swelling.      Right lower leg: No edema.      Left lower leg: No edema.   Neurological:      General: No focal deficit present.      Mental Status: She is alert.   Psychiatric:         Mood and Affect: Mood normal.          No results found for this or any previous visit from the past 1095 days.     No echocardiogram results found for the past 12 months  Encounter Date: 10/03/23   ECG 12 lead (Clinic Performed)    Narrative    Sinus rhythm.  Normal ECG.  HR 81 bpm.         ECG: Normal sinus rhythm.  Normal ECG.    Eusebio Mcclain MD   "

## 2023-11-07 ENCOUNTER — OFFICE VISIT (OUTPATIENT)
Dept: DERMATOLOGY | Facility: CLINIC | Age: 78
End: 2023-11-07

## 2023-11-07 DIAGNOSIS — L98.8 WRINKLES: Primary | ICD-10-CM

## 2023-11-07 LAB
ATRIAL RATE: 85 BPM
P AXIS: 31 DEGREES
P OFFSET: 198 MS
P ONSET: 158 MS
PR INTERVAL: 122 MS
Q ONSET: 219 MS
QRS COUNT: 14 BEATS
QRS DURATION: 76 MS
QT INTERVAL: 374 MS
QTC CALCULATION(BAZETT): 445 MS
QTC FREDERICIA: 420 MS
R AXIS: 41 DEGREES
T AXIS: 44 DEGREES
T OFFSET: 406 MS
VENTRICULAR RATE: 85 BPM

## 2023-11-07 PROCEDURE — 1159F MED LIST DOCD IN RCRD: CPT | Performed by: DERMATOLOGY

## 2023-11-07 PROCEDURE — 1160F RVW MEDS BY RX/DR IN RCRD: CPT | Performed by: DERMATOLOGY

## 2023-11-07 PROCEDURE — BOTOX BOTOX 1 UNIT: Performed by: DERMATOLOGY

## 2023-11-07 PROCEDURE — PBTXA ADMINISTRATION FEE COSMETIC: Performed by: DERMATOLOGY

## 2023-11-07 PROCEDURE — 1036F TOBACCO NON-USER: CPT | Performed by: DERMATOLOGY

## 2023-11-07 PROCEDURE — 1126F AMNT PAIN NOTED NONE PRSNT: CPT | Performed by: DERMATOLOGY

## 2023-11-07 ASSESSMENT — DERMATOLOGY PATIENT ASSESSMENT
DO YOU USE A TANNING BED: NO
DO YOU HAVE ANY NEW OR CHANGING LESIONS: NO
ARE YOU AN ORGAN TRANSPLANT RECIPIENT: NO
ARE YOU TRYING TO GET PREGNANT: NO
HAVE YOU HAD OR DO YOU HAVE VASCULAR DISEASE: NO
DO YOU HAVE IRREGULAR MENSTRUAL CYCLES: NO
DO YOU USE SUNSCREEN: OCCASIONALLY
HAVE YOU HAD OR DO YOU HAVE A STAPH INFECTION: NO
ARE YOU ON BIRTH CONTROL: NO

## 2023-11-07 ASSESSMENT — DERMATOLOGY QUALITY OF LIFE (QOL) ASSESSMENT
ARE THERE EXCLUSIONS OR EXCEPTIONS FOR THE QUALITY OF LIFE ASSESSMENT: NO
DATE THE QUALITY-OF-LIFE ASSESSMENT WAS COMPLETED: 66785
RATE HOW BOTHERED YOU ARE BY EFFECTS OF YOUR SKIN PROBLEMS ON YOUR ACTIVITIES (EG, GOING OUT, ACCOMPLISHING WHAT YOU WANT, WORK ACTIVITIES OR YOUR RELATIONSHIPS WITH OTHERS): 0 - NEVER BOTHERED
RATE HOW EMOTIONALLY BOTHERED YOU ARE BY YOUR SKIN PROBLEM (FOR EXAMPLE, WORRY, EMBARRASSMENT, FRUSTRATION): 0 - NEVER BOTHERED
RATE HOW BOTHERED YOU ARE BY SYMPTOMS OF YOUR SKIN PROBLEM (EG, ITCHING, STINGING BURNING, HURTING OR SKIN IRRITATION): 0 - NEVER BOTHERED
WHAT SINGLE SKIN CONDITION LISTED BELOW IS THE PATIENT ANSWERING THE QUALITY-OF-LIFE ASSESSMENT QUESTIONS ABOUT: NONE OF THE ABOVE

## 2023-11-07 ASSESSMENT — PATIENT GLOBAL ASSESSMENT (PGA): PATIENT GLOBAL ASSESSMENT: PATIENT GLOBAL ASSESSMENT:  1 - CLEAR

## 2023-11-07 ASSESSMENT — ITCH NUMERIC RATING SCALE: HOW SEVERE IS YOUR ITCHING?: 0

## 2023-11-28 ENCOUNTER — TELEPHONE (OUTPATIENT)
Dept: PAIN MEDICINE | Facility: CLINIC | Age: 78
End: 2023-11-28
Payer: MEDICARE

## 2023-11-28 NOTE — TELEPHONE ENCOUNTER
Called pt, [place] scheduled 12/1/23    ----- Message from Zoraida Mejia sent at 11/28/2023 10:14 AM EST -----  Regarding: Epidural steroid injection  Contact: [phone]  I had an epidural steroid injection June 1st and the effects are wearing off.  Is there any chance can get another injection before [place]?  I can be reached at home at  or cell phone     Thanks

## 2023-11-29 ENCOUNTER — TELEPHONE (OUTPATIENT)
Dept: PRIMARY CARE | Facility: CLINIC | Age: 78
End: 2023-11-29

## 2023-11-29 ENCOUNTER — APPOINTMENT (OUTPATIENT)
Dept: ORTHOPEDIC SURGERY | Facility: CLINIC | Age: 78
End: 2023-11-29
Payer: MEDICARE

## 2023-11-29 ENCOUNTER — LAB (OUTPATIENT)
Dept: LAB | Facility: LAB | Age: 78
End: 2023-11-29
Payer: MEDICARE

## 2023-11-29 DIAGNOSIS — E78.49 OTHER HYPERLIPIDEMIA: Primary | ICD-10-CM

## 2023-11-29 DIAGNOSIS — E78.49 OTHER HYPERLIPIDEMIA: ICD-10-CM

## 2023-11-29 LAB
ALBUMIN SERPL BCP-MCNC: 4.4 G/DL (ref 3.4–5)
ALP SERPL-CCNC: 84 U/L (ref 33–136)
ALT SERPL W P-5'-P-CCNC: 28 U/L (ref 7–45)
AST SERPL W P-5'-P-CCNC: 27 U/L (ref 9–39)
BILIRUB DIRECT SERPL-MCNC: 0.1 MG/DL (ref 0–0.3)
BILIRUB SERPL-MCNC: 0.6 MG/DL (ref 0–1.2)
CHOLEST SERPL-MCNC: 179 MG/DL (ref 0–199)
CHOLESTEROL/HDL RATIO: 2.5
CK SERPL-CCNC: 53 U/L (ref 0–215)
HDLC SERPL-MCNC: 72.7 MG/DL
LDLC SERPL CALC-MCNC: 87 MG/DL
NON HDL CHOLESTEROL: 106 MG/DL (ref 0–149)
PROT SERPL-MCNC: 7.6 G/DL (ref 6.4–8.2)
TRIGL SERPL-MCNC: 98 MG/DL (ref 0–149)
VLDL: 20 MG/DL (ref 0–40)

## 2023-11-29 PROCEDURE — 36415 COLL VENOUS BLD VENIPUNCTURE: CPT

## 2023-11-29 PROCEDURE — 80061 LIPID PANEL: CPT

## 2023-11-29 PROCEDURE — 80076 HEPATIC FUNCTION PANEL: CPT

## 2023-11-29 PROCEDURE — 82550 ASSAY OF CK (CPK): CPT

## 2023-11-29 NOTE — TELEPHONE ENCOUNTER
Pt is in her car she has an appt on Monday and thought she was supposed to get blood work. Pt was told by the lab that the order can't be done until January. Please call ASAP on order if it should be done?

## 2023-12-01 ENCOUNTER — OFFICE VISIT (OUTPATIENT)
Dept: PAIN MEDICINE | Facility: CLINIC | Age: 78
End: 2023-12-01
Payer: MEDICARE

## 2023-12-01 VITALS
HEART RATE: 90 BPM | RESPIRATION RATE: 20 BRPM | DIASTOLIC BLOOD PRESSURE: 74 MMHG | SYSTOLIC BLOOD PRESSURE: 134 MMHG | TEMPERATURE: 97 F

## 2023-12-01 DIAGNOSIS — M54.16 LUMBAR RADICULAR PAIN: Primary | ICD-10-CM

## 2023-12-01 PROCEDURE — 1036F TOBACCO NON-USER: CPT | Performed by: PHYSICIAN ASSISTANT

## 2023-12-01 PROCEDURE — 1160F RVW MEDS BY RX/DR IN RCRD: CPT | Performed by: PHYSICIAN ASSISTANT

## 2023-12-01 PROCEDURE — 3078F DIAST BP <80 MM HG: CPT | Performed by: PHYSICIAN ASSISTANT

## 2023-12-01 PROCEDURE — 1159F MED LIST DOCD IN RCRD: CPT | Performed by: PHYSICIAN ASSISTANT

## 2023-12-01 PROCEDURE — 3075F SYST BP GE 130 - 139MM HG: CPT | Performed by: PHYSICIAN ASSISTANT

## 2023-12-01 PROCEDURE — 1126F AMNT PAIN NOTED NONE PRSNT: CPT | Performed by: PHYSICIAN ASSISTANT

## 2023-12-01 PROCEDURE — 1157F ADVNC CARE PLAN IN RCRD: CPT | Performed by: PHYSICIAN ASSISTANT

## 2023-12-01 PROCEDURE — 99214 OFFICE O/P EST MOD 30 MIN: CPT | Mod: ZK | Performed by: PHYSICIAN ASSISTANT

## 2023-12-01 PROCEDURE — 99204 OFFICE O/P NEW MOD 45 MIN: CPT | Performed by: PHYSICIAN ASSISTANT

## 2023-12-01 RX ORDER — METHYLPREDNISOLONE 4 MG/1
TABLET ORAL
Qty: 21 TABLET | Refills: 0 | Status: SHIPPED | OUTPATIENT
Start: 2023-12-01 | End: 2023-12-04 | Stop reason: ALTCHOICE

## 2023-12-01 ASSESSMENT — COLUMBIA-SUICIDE SEVERITY RATING SCALE - C-SSRS
1. IN THE PAST MONTH, HAVE YOU WISHED YOU WERE DEAD OR WISHED YOU COULD GO TO SLEEP AND NOT WAKE UP?: NO
2. HAVE YOU ACTUALLY HAD ANY THOUGHTS OF KILLING YOURSELF?: NO
6. HAVE YOU EVER DONE ANYTHING, STARTED TO DO ANYTHING, OR PREPARED TO DO ANYTHING TO END YOUR LIFE?: NO

## 2023-12-01 ASSESSMENT — PATIENT HEALTH QUESTIONNAIRE - PHQ9
2. FEELING DOWN, DEPRESSED OR HOPELESS: NOT AT ALL
SUM OF ALL RESPONSES TO PHQ9 QUESTIONS 1 AND 2: 0
1. LITTLE INTEREST OR PLEASURE IN DOING THINGS: NOT AT ALL

## 2023-12-01 ASSESSMENT — PAIN SCALES - GENERAL: PAINLEVEL_OUTOF10: 4

## 2023-12-01 ASSESSMENT — PAIN - FUNCTIONAL ASSESSMENT: PAIN_FUNCTIONAL_ASSESSMENT: 0-10

## 2023-12-01 ASSESSMENT — PAIN DESCRIPTION - DESCRIPTORS: DESCRIPTORS: ACHING;TINGLING

## 2023-12-01 NOTE — PROGRESS NOTES
"  Subjective   Patient ID: Zoraida Mejia is a 78 y.o. female who presents for Back Pain. Is here for back pain and wants to discuss possible epidural injection.    HPI  Denies any new health changes     She was started on lisinopril 2.5mg QD on 11/3, also she went back to taking a statin, eyes are blurrier, she is talking to her pcp about it     Had a lumbar epidural in June 2023, got 85-90% relief    Her pain started to return approximately 6 weeks ago    \"It's not anywhere near what it was last time\"     She aches     If she walks or does anything for a few minutes, she can't straighten her back and has to sit     She noticed when the weather started to change, and gets colder, her pain has been worse     Hx kyphoplasty    Saw orthopedic spine surgeon Dr Clemens, and was told to see a physical rehabilitation medicine specialist for 12/27/23    Has new hearing aids     Pain is primarily in her right lower back with some radiation to the right thoracic region and down the right buttock and to the back of the right thigh; sometimes shoots to right foot     Occasional numbness in the toes of her right foot     She takes ibuprofen as needed for pain     She tried gabapentin and she didn't like it, \"I didn't see any relief,\" and she didn't like the idea of raising the doses     She sees a rheumatologist and she gets flexeril; it helps her sleep    She is going to try the heating pad    Complains of constipation     Physical therapy helped her     Patient denies any new onset of weakness in the lower extremity or upper extremity or any new bladder or bowel dysfunction.      Review of Systems    All 13 systems were reviewed and are within normal levels except as noted below or per HPI. Positive and pertinent negative responses are noted below or in the HPI   Denied any fever or chills. No weight loss and no night sweats. No cough or sputum production. No diarrhea   No constipation  No bladder and bowel incontinence " and no other changes in bladder and bowel. No skin changes.   Denied opioids diversion and abuse and denies alcoholism. Denies overuse of pain medications.      Past medical history  interval changes as noted in hpi    Objective   Physical Exam       General:   Alert, oriented, pleasant, and cooperative. Does not appear to be in any major distress.     HEENT:   Pupils normal in size. Ears, nose, mouth, and throat appear to be in normal condition. Head atraumatic.   No signs of sedation or withdrawal apparent.    Psychiatric:   No signs of depression apparent.     Neuro:   No focal neurological deficit apparent. Ambulation at baseline.     Respiratory:   Normal respiratory distress     Abdomen:   No distention     Skin:   No skin markings supportive of recent IV drug use.     Cardiovascular:   Regular rate.        MRN: [id number]  Patient Name: CODIE WEEMS     STUDY:  MRI L-SPINE WO;  5/8/2023 3:15 pm     INDICATION:  PAIN  M54.50: Lower back pain M51.36: Degenerative disc disease,  lumbar S32.020A: Compression fracture of L2 lumbar vertebra PT HAS LT  ANKLE SINCE 2010.     COMPARISON:  12/28/2022 limited exam of the lumbar spine on CT of the abdomen     ACCESSION NUMBER(S):  [id number]     ORDERING CLINICIAN:  DARSHANA PALAFOX     TECHNIQUE:  Sagittal T1, T2, STIR, axial T1 and T2 weighted images of the lumbar  spine were acquired.     FINDINGS:  There is persistent moderate anterior wedging of the L2 vertebral  body with 5 mm of retropulsion without significant change compared  with the prior CT. Minimal T1 hypointensity and T2 stir  hyperintensity along the inferior endplate of the L1 vertebral body  may be due to recent Schmorl's node formation and or type 1 Modic  discogenic degenerative change. There is mild levoconvex scoliosis of  the lumbar spine with apex at the L4 level. No other significant  abnormality of height, alignment, or signal of the lumbar vertebral  bodies. The conus medullaris terminates  appropriately at  approximately the L1-2 level.     T12-L1:  There is no significant central canal or neural foraminal  stenosis.     L1-2:  Retropulsed bony material with superimposed central herniation  moderately indents the ventral aspect of the central canal without  impinging upon the conus medullaris with potential irritation however  of the traversing nerve roots including the traversing L2 nerve  roots. There is minimal bilateral foraminal stenosis due to  underlying disc bulge and hypertrophic facet change.     L2-3:  Disc bulge minimally indents the ventral thecal sac and  minimally narrows the neuroforamina.     L3-4:  Disc bulge minimally indents the ventral thecal sac and  minimally narrows the neuroforamina.     L4-5:  Posterior endplate osteophytes and disc bulge combine with  hypertrophic facet changes and ligamentum flavum hypertrophy to  partially efface the subarticular zones on both sides and minimally  to moderately narrow the right and minimally narrow the left  neuroforamina.     L5-S1:  Disc bulge minimally indents the ventral thecal sac and  combines with hypertrophic facet changes and ligamentum flavum  hypertrophy to partially efface the left greater than right  subarticular zone and minimally narrow the neuroforamina.     IMPRESSION:  Findings of chronic compression fracture at L2 without significant  change identified compared with 12/28/2022 abdominal CT and with  retropulsion of fragments at this level indenting the ventral thecal  sac and possibly irritating the traversing L2 nerve roots are not  well discretely visualized. Additional findings including  degenerative changes as above.    Assessment/Plan   Diagnoses and all orders for this visit:  Lumbar radicular pain  -     Schedule appointment; Future  -     Epidural Steroid Injection; Future       Plan:   Failed therapy with NSAIDs and physical therapy     Schedule a Midline lumbar epidural steroid injection. Level performed L5-S1      Medrol Dose Pack     Follow up on an as needed basis in 3 months or sooner if needed       [place] dated TODAY  reviewed. No signs or abuse or misuse of prescribed medication.    Advised to continue following the guidelines to prevent the spread of COVID19  1. Frequent handwashing.  2. Avoiding touching face.  3. Sneeze or cough into a tissue or inside your elbow.  4. Disinfect frequently used items and surfaces as much as possible.  5. Wear a mask around people.  6. If you feel sick stay home.  7. Practice social distancing.  8. Avoid discretionary traveling, shopping trips, and social visits    Assessment/Plan:. The most applicable treatment options considered and discussed with the patient at this time, including a combination of physical therapy approaches, psychological/psychiatric approaches, pharmacologic management, interventional procedures and pain management surgeries (such as spinal cord stimulation and intrathecal pump placement. Risk of complications and/or morbidity and mortality is high given the acute and chronic pain may pose a threat to life and bodily functions if undertreated, poorly treated or with failure to maintain adequate and timely follow-up. Given the serious and fluctuating nature of pain (with extensive considerations whenever pain changes, worsens and even if it improves), there always remains the possibility of prolonged functional impairment requiring constant patient re-assessment and high level medical decision making. The amount and complexity of data reviewed is high given the patient labs, radiology reports and other tests were obtained and reviewed (summarized as applicable). Pertinent positive and negative findings were considered in medical decision making.   The patient was counseled regarding diagnostic results, instructions for management, risk factor reductions, prognosis, patient and family education, impressions, risks and benefits of treatment options and importance  of compliance with treatment.    The level of clinical decision making in this office visit, is high, given the high risks of complications with the morbidity and mortality due to the fact that acute and chronic pain may pose a threat to the life and bodily function, if under treated, poorly treated, or with failure to maintain adequate treatment and timely medical follow up. Additionally over treatment has its own set of complications including overdosing on the pain medications and also the habit forming effects of the medications used to treat chronic painful conditions including therapeutic classes classified as dangerous medications. Given the serious and fluctuating nature of pain with extensive consideration for whenever pain changes, there is always the risk of prolonged functional impairment requiring close patient assessment and reassessment and high level medical decision making at every office visit. The amount and complexity of data reviewed is high given the patient clinical presentation, labs,  data, radiology reports, and other tests as above. Pertinent data whether positive or negative were taken in consideration in the process of making this high level medical decision.     The patient was counseled regarding risk factor reductions, prognosis, patient and family education, impressions, risks and benefits of treatment options and importance of compliance with treatment.     Total amount of time spent with patient was 30 minutes with more than 50% of the time devoted to history taking physical examination face-to-face time and coordination of care  Thank you for allowing me to participate in the care of this patient    Plan was explained to patient, and the patient verbalized understanding and agreement with the plan.           *Please note this report has been produced using speech recognition software and may contain errors related to that system including grammar, punctuation and spelling as  well as words and phrases that may be inappropriate. If there are questions or concerns, please feel free to contact me to clarify.

## 2023-12-01 NOTE — PATIENT INSTRUCTIONS
Follow up on an as needed basis  in 3 months or sooner if needed    May take tylenol as needed for pain with meloxicam     Do not exceed 3000mg of tylenol per day    Avoid other Non-steroidal anti-inflammatory medications (Advil, aleve, naproxen, ibuprofen, motrin, aspirin, naprosyn)    If you decide to take the medrol dose pack, hold your meloxicam while you are on it, may resume meloxicam after you complete the steroid pack     Please do not hesitate to contact the pain clinic after your visit with any questions or concerns at  M-F 8-4 pm

## 2023-12-04 ENCOUNTER — OFFICE VISIT (OUTPATIENT)
Dept: PRIMARY CARE | Facility: CLINIC | Age: 78
End: 2023-12-04
Payer: MEDICARE

## 2023-12-04 VITALS
SYSTOLIC BLOOD PRESSURE: 125 MMHG | DIASTOLIC BLOOD PRESSURE: 86 MMHG | HEART RATE: 93 BPM | WEIGHT: 120.8 LBS | BODY MASS INDEX: 22.81 KG/M2 | TEMPERATURE: 97.5 F | OXYGEN SATURATION: 100 % | HEIGHT: 61 IN

## 2023-12-04 DIAGNOSIS — E78.49 OTHER HYPERLIPIDEMIA: ICD-10-CM

## 2023-12-04 DIAGNOSIS — M80.00XD OSTEOPOROSIS WITH CURRENT PATHOLOGICAL FRACTURE WITH ROUTINE HEALING, UNSPECIFIED OSTEOPOROSIS TYPE, SUBSEQUENT ENCOUNTER: ICD-10-CM

## 2023-12-04 DIAGNOSIS — F41.9 ANXIETY: Primary | ICD-10-CM

## 2023-12-04 DIAGNOSIS — I10 PRIMARY HYPERTENSION: ICD-10-CM

## 2023-12-04 DIAGNOSIS — E03.9 ACQUIRED HYPOTHYROIDISM: ICD-10-CM

## 2023-12-04 PROCEDURE — 3079F DIAST BP 80-89 MM HG: CPT | Performed by: INTERNAL MEDICINE

## 2023-12-04 PROCEDURE — 3074F SYST BP LT 130 MM HG: CPT | Performed by: INTERNAL MEDICINE

## 2023-12-04 PROCEDURE — 1036F TOBACCO NON-USER: CPT | Performed by: INTERNAL MEDICINE

## 2023-12-04 PROCEDURE — 1126F AMNT PAIN NOTED NONE PRSNT: CPT | Performed by: INTERNAL MEDICINE

## 2023-12-04 PROCEDURE — 99214 OFFICE O/P EST MOD 30 MIN: CPT | Performed by: INTERNAL MEDICINE

## 2023-12-04 PROCEDURE — 1160F RVW MEDS BY RX/DR IN RCRD: CPT | Performed by: INTERNAL MEDICINE

## 2023-12-04 PROCEDURE — 1159F MED LIST DOCD IN RCRD: CPT | Performed by: INTERNAL MEDICINE

## 2023-12-04 RX ORDER — SERTRALINE HYDROCHLORIDE 25 MG/1
25 TABLET, FILM COATED ORAL DAILY
Qty: 90 TABLET | Refills: 3 | Status: SHIPPED | OUTPATIENT
Start: 2023-12-04 | End: 2024-01-12

## 2023-12-04 ASSESSMENT — PATIENT HEALTH QUESTIONNAIRE - PHQ9
SUM OF ALL RESPONSES TO PHQ9 QUESTIONS 1 AND 2: 0
1. LITTLE INTEREST OR PLEASURE IN DOING THINGS: NOT AT ALL
2. FEELING DOWN, DEPRESSED OR HOPELESS: NOT AT ALL

## 2023-12-04 NOTE — PROGRESS NOTES
"Subjective   Patient ID: Zoraida Mejia is a 78 y.o. female who presents for 2 month follow up.    Here to follow up on medical problems.  she has ,HLD, depression,anxiety, hypothyroid,osteoporosis,tubular adenoma,GERD,h/o ovarian cyst,h/o kidney stone .taking her meds regularly,no side effects.She also has osteoporosis,was on Bisphosphonate for 5 years then on drug holiday,her DEXA showed more bone loss,she refused to start Prolia due to high cost and co pay,but she is currently on IV Reclast and was covered around Mar 2023.  She has a wound on her leg from her garden.  She also has her usual  LBP with radiation to both legs posterior aspect.  She had history of multilevel degenerative disc disease lumbar spine and L2 vertebral compression fracture few months ago,seen by [facility],ortho and pain management.she saw ortho,pain management,[facility],had 1 nerve block with some improvement but it came back and has an appt with Dr Masters.  No urinary symptoms or incontinence.no legs weakness.  She denies depression,she feels anxious,she has 1-2 beers per day.           Review of Systems   Respiratory:  Negative for chest tightness.    Psychiatric/Behavioral:  The patient is nervous/anxious.        Objective   /86 (BP Location: Left arm, Patient Position: Sitting, BP Cuff Size: Adult)   Pulse 93   Temp 36.4 °C (97.5 °F) (Temporal)   Ht 1.549 m (5' 1\")   Wt 54.8 kg (120 lb 12.8 oz)   SpO2 100%   BMI 22.82 kg/m²     Physical Exam  Constitutional:       Appearance: Normal appearance.   HENT:      Head: Normocephalic and atraumatic.   Eyes:      Extraocular Movements: Extraocular movements intact.      Pupils: Pupils are equal, round, and reactive to light.   Cardiovascular:      Rate and Rhythm: Normal rate and regular rhythm.      Heart sounds: Normal heart sounds.   Pulmonary:      Effort: Pulmonary effort is normal.      Breath sounds: Normal breath sounds. No wheezing or rhonchi.   Abdominal:      General: Abdomen is " flat. Bowel sounds are normal. There is no distension.      Palpations: Abdomen is soft.   Musculoskeletal:         General: Normal range of motion.      Cervical back: Normal range of motion and neck supple.      Right lower leg: No edema.      Left lower leg: No edema.   Skin:     General: Skin is warm.   Neurological:      General: No focal deficit present.      Mental Status: She is alert and oriented to person, place, and time.   Psychiatric:         Behavior: Behavior normal.      Comments: Anxious.         Assessment/Plan   Problem List Items Addressed This Visit             ICD-10-CM    Anxiety - Primary F41.9    Relevant Medications    sertraline (Zoloft) 25 mg tablet    Hyperlipidemia E78.5     Continue statin and low-fat diet.         Hypothyroidism E03.9     Stable.         Osteoporosis M81.0     Continue IV Reclast ,calcium vitamin D and weightbearing exercise.  Fall prevention.         Primary hypertension I10     On lisinopril.

## 2023-12-07 ENCOUNTER — OFFICE VISIT (OUTPATIENT)
Dept: SURGERY | Facility: CLINIC | Age: 78
End: 2023-12-07
Payer: MEDICARE

## 2023-12-07 VITALS
HEIGHT: 61 IN | WEIGHT: 121 LBS | SYSTOLIC BLOOD PRESSURE: 138 MMHG | BODY MASS INDEX: 22.84 KG/M2 | TEMPERATURE: 97.5 F | DIASTOLIC BLOOD PRESSURE: 72 MMHG | HEART RATE: 84 BPM

## 2023-12-07 DIAGNOSIS — K59.09 CHRONIC CONSTIPATION: Primary | ICD-10-CM

## 2023-12-07 PROCEDURE — 99213 OFFICE O/P EST LOW 20 MIN: CPT | Mod: PO | Performed by: NURSE PRACTITIONER

## 2023-12-07 PROCEDURE — 1036F TOBACCO NON-USER: CPT | Performed by: NURSE PRACTITIONER

## 2023-12-07 PROCEDURE — 1160F RVW MEDS BY RX/DR IN RCRD: CPT | Performed by: NURSE PRACTITIONER

## 2023-12-07 PROCEDURE — 3075F SYST BP GE 130 - 139MM HG: CPT | Performed by: NURSE PRACTITIONER

## 2023-12-07 PROCEDURE — 1126F AMNT PAIN NOTED NONE PRSNT: CPT | Performed by: NURSE PRACTITIONER

## 2023-12-07 PROCEDURE — 3078F DIAST BP <80 MM HG: CPT | Performed by: NURSE PRACTITIONER

## 2023-12-07 PROCEDURE — 1159F MED LIST DOCD IN RCRD: CPT | Performed by: NURSE PRACTITIONER

## 2023-12-07 PROCEDURE — 99203 OFFICE O/P NEW LOW 30 MIN: CPT | Performed by: NURSE PRACTITIONER

## 2023-12-07 NOTE — PROGRESS NOTES
Subjective   Patient ID: Zoraida Mejia is a 78 y.o. female who has a hx of constipation, hypothyroidism and HTN who presents today for an evaluation to have a colonoscopy in the near future.    HPI  She has a hx of chronic constipation.  She takes Dulcolax 2-4 tablets daily and Miralax daily.  She will do a colon cleanse prn.  She tried Linzess  10 years ago but it did not work.  She will have a soft-hard BM every 3 days and will have bloating as well.  She will take fiber supplements prn. She does not eat a lot of fiber or drink a  lot of water daily.  No c/o any rectal bleeding.  No c/o any accidents or leakage of stool.     Last colonoscopy 2019    No hx of any perianal surgeries.  She has had 4 vaginal births with no tear.      Review of Systems   All other systems reviewed and are negative.      Objective   Physical Exam  Constitutional:       Appearance: Normal appearance.   HENT:      Head: Normocephalic.   Cardiovascular:      Rate and Rhythm: Normal rate and regular rhythm.   Pulmonary:      Effort: Pulmonary effort is normal.      Breath sounds: Normal breath sounds.   Abdominal:      General: Abdomen is flat. Bowel sounds are normal.      Palpations: Abdomen is soft.   Musculoskeletal:         General: Normal range of motion.      Cervical back: Normal range of motion and neck supple.   Skin:     General: Skin is warm and dry.   Neurological:      General: No focal deficit present.      Mental Status: She is alert and oriented to person, place, and time.   Psychiatric:         Mood and Affect: Mood normal.         Behavior: Behavior normal.         Assessment/Plan   Zoraida has chronic constipation and will start taking Linzess daily to help her have better BM;s.  She will schedule to have  a colonoscopy in the near future.  She will call with any issues and will follow up if the Linzess does not help.       Jaymie Yung, QASIM-NADINE 12/07/23 8:01 AM

## 2023-12-10 PROBLEM — I10 PRIMARY HYPERTENSION: Status: ACTIVE | Noted: 2023-12-10

## 2023-12-10 ASSESSMENT — ENCOUNTER SYMPTOMS
NERVOUS/ANXIOUS: 1
CHEST TIGHTNESS: 0

## 2023-12-12 ENCOUNTER — HOSPITAL ENCOUNTER (OUTPATIENT)
Dept: RADIOLOGY | Facility: HOSPITAL | Age: 78
Discharge: HOME | End: 2023-12-12
Payer: MEDICARE

## 2023-12-12 ENCOUNTER — HOSPITAL ENCOUNTER (OUTPATIENT)
Dept: PAIN MEDICINE | Facility: CLINIC | Age: 78
Discharge: HOME | End: 2023-12-12
Payer: MEDICARE

## 2023-12-12 VITALS
DIASTOLIC BLOOD PRESSURE: 67 MMHG | OXYGEN SATURATION: 98 % | RESPIRATION RATE: 20 BRPM | SYSTOLIC BLOOD PRESSURE: 163 MMHG | HEART RATE: 82 BPM | TEMPERATURE: 96.8 F

## 2023-12-12 DIAGNOSIS — M54.16 LUMBAR RADICULAR PAIN: ICD-10-CM

## 2023-12-12 PROCEDURE — 7100000010 HC PHASE TWO TIME - EACH INCREMENTAL 1 MINUTE: Performed by: PAIN MEDICINE

## 2023-12-12 PROCEDURE — 96372 THER/PROPH/DIAG INJ SC/IM: CPT | Performed by: PAIN MEDICINE

## 2023-12-12 PROCEDURE — 62323 NJX INTERLAMINAR LMBR/SAC: CPT | Performed by: PAIN MEDICINE

## 2023-12-12 PROCEDURE — 7100000009 HC PHASE TWO TIME - INITIAL BASE CHARGE: Performed by: PAIN MEDICINE

## 2023-12-12 PROCEDURE — 2500000004 HC RX 250 GENERAL PHARMACY W/ HCPCS (ALT 636 FOR OP/ED): Performed by: PAIN MEDICINE

## 2023-12-12 PROCEDURE — 76000 FLUOROSCOPY <1 HR PHYS/QHP: CPT

## 2023-12-12 PROCEDURE — 2550000001 HC RX 255 CONTRASTS: Performed by: PAIN MEDICINE

## 2023-12-12 RX ORDER — METHYLPREDNISOLONE ACETATE 40 MG/ML
80 INJECTION, SUSPENSION INTRA-ARTICULAR; INTRALESIONAL; INTRAMUSCULAR; SOFT TISSUE ONCE
Status: COMPLETED | OUTPATIENT
Start: 2023-12-12 | End: 2023-12-12

## 2023-12-12 RX ORDER — BUPIVACAINE HYDROCHLORIDE 2.5 MG/ML
5 INJECTION, SOLUTION EPIDURAL; INFILTRATION; INTRACAUDAL ONCE
Status: COMPLETED | OUTPATIENT
Start: 2023-12-12 | End: 2023-12-12

## 2023-12-12 RX ADMIN — BUPIVACAINE HYDROCHLORIDE 12.5 MG: 2.5 INJECTION, SOLUTION EPIDURAL; INFILTRATION; INTRACAUDAL; PERINEURAL at 08:14

## 2023-12-12 RX ADMIN — METHYLPREDNISOLONE ACETATE 80 MG: 40 INJECTION, SUSPENSION INTRA-ARTICULAR; INTRALESIONAL; INTRAMUSCULAR; SOFT TISSUE at 08:15

## 2023-12-12 RX ADMIN — IOHEXOL 10 ML: 300 INJECTION, SOLUTION INTRAVENOUS at 08:16

## 2023-12-12 ASSESSMENT — PAIN SCALES - GENERAL: PAINLEVEL_OUTOF10: 4

## 2023-12-12 ASSESSMENT — PAIN - FUNCTIONAL ASSESSMENT: PAIN_FUNCTIONAL_ASSESSMENT: 0-10

## 2023-12-12 NOTE — DISCHARGE INSTRUCTIONS
Post-injection instructions:    Your pain may not be gone immediately after the procedure--it usually takes the steroid 3-5 days to start working.   It may take several weeks for the medicine to reach its' full effect.   Pay attention to how much pain relief (what percentage compared to before the procedure) you get and for how long it lasts.     Activity: Avoid strenuous activity for 24 hours. After that return to your normal activity level.     Bandages: Remove after 24 hours     Showering/Bathing: You may shower after bandage is removed     Follow up: CALL OFFICE IN 7 DAYS [phone] LEAVE MESSAGE ABOUT THE RELIEF THAT WAS OBTAINED      Call the doctor immediately: if you notice:     Excessive bleeding from procedure site (brisk bright red bleeding from the site or bleeding that soaks the bandages or does not stop)   Severe headache  Inability to walk, leg or arm weakness or numbness that is worse after the procedure   Uncontrolled pain   New urinary or fecal incontinence   Signs of infection: Fever above 101.5F, redness, swelling, pus or drainage from the site    Epidural Injection    Why is this procedure done?  With an epidural injection, the doctor injects drugs deep into the area around your spinal cord. This is different than epidural anesthesia that is used for surgery or when a woman has a baby. Your spine is a group of bones in your back that protect the nerves in your spinal cord. Problems with your spine can cause swollen nerves in the spinal cord. This swelling leads to pain and can limit movement. In an epidural injection, the doctor may give you a drug to help with swelling and pain.  You may have an epidural injection in different parts of your back, based on where your pain is. For pain in your head or arms, you may have a cervical epidural injection. If your pain is in your upper or middle back, you may get a thoracic epidural injection. For pain in your lower back or legs, you may get a  lumbar epidural injection.    What will the results be?  The treatment may:  Lower pain  Reduce swelling in the nerves  Improve movement  What happens before the procedure?  Your doctor will take your history. Talk to the doctor about:  All the drugs you are taking. Be sure to include all prescription and over-the-counter (OTC) drugs, and herbal supplements. Tell the doctor about any drug allergy. Bring a list of drugs you take with you.  If you have high blood sugar or diabetes. Your drugs may need to be changed.  Any bleeding problems. Be sure to tell your doctor if you are taking any drugs that may cause bleeding. Some of these are warfarin, rivaroxaban, apixaban, ticagrelor, clopidogrel, ketorolac, ibuprofen, naproxen, or aspirin. Certain vitamins and herbs, such as garlic and fish oil, may also add to the risk for bleeding. You may need to stop these drugs as well. Talk to your doctor about them.  Tell the doctor if you are pregnant.  You will not be allowed to drive right away after the procedure. Ask a family member or a friend to drive you home.  What happens during the procedure?  To help the doctor make sure the drugs are being injected in the right place, your doctor may do an x-ray of your spine. Other times your doctor may do a continuous x-ray during the procedure. This is a fluoroscopy. The doctor may also use a colored dye or a contrast dye to check where to inject the drug.  You may be given a drug to help you relax. You may be given a drug to make the area of the injection numb.  The doctor will clean the skin on your back or neck. This helps prevent infection.  The doctor will put a needle through the skin toward your spine. The drug will be injected into a space near the spine.  The needle will be taken out and a bandage will be placed over the injection site.  What happens after the procedure?  Staff will check on you to make sure you are doing well. They will tell you when you can go  home.  What care is needed at home?  Relax on the day of the injection.  Do not drive or run machines for at least 12 hours afterwards.  Apply ice to the injection site. Place an ice pack or a bag of frozen peas wrapped in a towel over the painful part. Never put ice right on the skin. Do not leave the ice on more than 10 to 15 minutes at a time.  It may take a few days before you will feel the effects of the injection.  What follow-up care is needed?  Your doctor may ask you to make visits to the office to check on your progress. Be sure to keep these visits. You may also need to see a physical therapist (PT). The PT will teach you exercises to help you get back your strength and motion. Ask your doctor when you can exercise.  What problems could happen?  Bleeding  Infection (rare)  Headache  Nerve injury  If you have diabetes, your blood sugar can go up after the injection. Check with your doctor if you need more treatment for this.  Last Reviewed Date

## 2023-12-12 NOTE — OP NOTE
Operation  Midline lumbar epidural steroid injection. Level performed L5-S1    Surgical indications  The patient is here today to receive an epidural steroid injection to assist with lumbar radiculopathy     Operative report  The patient was taken to the procedure room, was placed into a prone positioning. The lumbar area was prepped and draped sterilely in the normal fashion. Under fluoroscopic guidance the epidural space was identified. The skin and subcutaneous tissue was anesthetized with 1% lidocaine. An 18-gauge Tuohy needle was introduced using the normal saline loss-of-resistance technique. Once the loss of resistance had been achieved, final positioning of the needle was confirmed with negative aspiration of heme and CSF, with the injection of contrast material showing spread in the epidural space, confirmed in AP and lateral view. Then the patient received 80 mg of Depo-Medrol diluted into normal saline preservative-free and 2 mL of 0.25% bupivacaine making total volume of 8 mL. The patient tolerated the procedure well, was taken to the recovery room, allowed to recover sufficient amount of time and then was discharged home in stable condition. The patient was advised to followup with the [facility] to reassess improvement on as-needed basis.    Thank you for allowing me to participate in the care of this patient.    Juju Huffman MD  Medical director of [facility]   8:16 AM  12/12/23

## 2023-12-26 DIAGNOSIS — M54.2 NECK PAIN: ICD-10-CM

## 2023-12-26 RX ORDER — CYCLOBENZAPRINE HCL 5 MG
5 TABLET ORAL AS NEEDED
Qty: 30 TABLET | Refills: 2 | Status: SHIPPED | OUTPATIENT
Start: 2023-12-26 | End: 2024-01-12 | Stop reason: ALTCHOICE

## 2023-12-27 ENCOUNTER — APPOINTMENT (OUTPATIENT)
Dept: ORTHOPEDIC SURGERY | Facility: CLINIC | Age: 78
End: 2023-12-27
Payer: MEDICARE

## 2024-01-04 ENCOUNTER — LAB (OUTPATIENT)
Dept: LAB | Facility: LAB | Age: 79
End: 2024-01-04
Payer: MEDICARE

## 2024-01-04 DIAGNOSIS — E78.5 HYPERLIPIDEMIA, UNSPECIFIED HYPERLIPIDEMIA TYPE: ICD-10-CM

## 2024-01-04 LAB
ALBUMIN SERPL BCP-MCNC: 4.1 G/DL (ref 3.4–5)
ALP SERPL-CCNC: 74 U/L (ref 33–136)
ALT SERPL W P-5'-P-CCNC: 16 U/L (ref 7–45)
AST SERPL W P-5'-P-CCNC: 21 U/L (ref 9–39)
BILIRUB DIRECT SERPL-MCNC: 0.1 MG/DL (ref 0–0.3)
BILIRUB SERPL-MCNC: 0.5 MG/DL (ref 0–1.2)
CHOLEST SERPL-MCNC: 178 MG/DL (ref 0–199)
CHOLESTEROL/HDL RATIO: 2.3
CK SERPL-CCNC: 45 U/L (ref 0–215)
HDLC SERPL-MCNC: 78.4 MG/DL
LDLC SERPL CALC-MCNC: 80 MG/DL
NON HDL CHOLESTEROL: 100 MG/DL (ref 0–149)
PROT SERPL-MCNC: 7.4 G/DL (ref 6.4–8.2)
TRIGL SERPL-MCNC: 97 MG/DL (ref 0–149)
VLDL: 19 MG/DL (ref 0–40)

## 2024-01-04 PROCEDURE — 80061 LIPID PANEL: CPT

## 2024-01-04 PROCEDURE — 82550 ASSAY OF CK (CPK): CPT

## 2024-01-04 PROCEDURE — 36415 COLL VENOUS BLD VENIPUNCTURE: CPT

## 2024-01-04 PROCEDURE — 80076 HEPATIC FUNCTION PANEL: CPT

## 2024-01-12 ENCOUNTER — OFFICE VISIT (OUTPATIENT)
Dept: PRIMARY CARE | Facility: CLINIC | Age: 79
End: 2024-01-12
Payer: MEDICARE

## 2024-01-12 VITALS
DIASTOLIC BLOOD PRESSURE: 78 MMHG | OXYGEN SATURATION: 99 % | TEMPERATURE: 98 F | BODY MASS INDEX: 22.58 KG/M2 | SYSTOLIC BLOOD PRESSURE: 119 MMHG | HEIGHT: 61 IN | WEIGHT: 119.6 LBS | HEART RATE: 74 BPM

## 2024-01-12 DIAGNOSIS — E78.5 HYPERLIPIDEMIA, UNSPECIFIED HYPERLIPIDEMIA TYPE: ICD-10-CM

## 2024-01-12 DIAGNOSIS — E03.9 ACQUIRED HYPOTHYROIDISM: ICD-10-CM

## 2024-01-12 DIAGNOSIS — I10 PRIMARY HYPERTENSION: Primary | ICD-10-CM

## 2024-01-12 DIAGNOSIS — F41.9 ANXIETY: ICD-10-CM

## 2024-01-12 DIAGNOSIS — K59.00 CONSTIPATION, UNSPECIFIED CONSTIPATION TYPE: ICD-10-CM

## 2024-01-12 DIAGNOSIS — S32.020D COMPRESSION FRACTURE OF L2 VERTEBRA WITH ROUTINE HEALING, SUBSEQUENT ENCOUNTER: ICD-10-CM

## 2024-01-12 PROBLEM — R10.30 LOWER ABDOMINAL PAIN: Status: RESOLVED | Noted: 2023-02-16 | Resolved: 2024-01-12

## 2024-01-12 PROBLEM — R06.09 DYSPNEA ON EXERTION: Status: ACTIVE | Noted: 2024-01-12

## 2024-01-12 PROBLEM — Z86.69 HISTORY OF CATARACT: Status: ACTIVE | Noted: 2024-01-12

## 2024-01-12 PROBLEM — M79.643 PAIN OF HAND: Status: ACTIVE | Noted: 2024-01-12

## 2024-01-12 PROCEDURE — 3074F SYST BP LT 130 MM HG: CPT | Performed by: INTERNAL MEDICINE

## 2024-01-12 PROCEDURE — 1126F AMNT PAIN NOTED NONE PRSNT: CPT | Performed by: INTERNAL MEDICINE

## 2024-01-12 PROCEDURE — 99214 OFFICE O/P EST MOD 30 MIN: CPT | Performed by: INTERNAL MEDICINE

## 2024-01-12 PROCEDURE — 1036F TOBACCO NON-USER: CPT | Performed by: INTERNAL MEDICINE

## 2024-01-12 PROCEDURE — 3078F DIAST BP <80 MM HG: CPT | Performed by: INTERNAL MEDICINE

## 2024-01-12 PROCEDURE — 1159F MED LIST DOCD IN RCRD: CPT | Performed by: INTERNAL MEDICINE

## 2024-01-12 RX ORDER — LORAZEPAM 0.5 MG/1
0.5 TABLET ORAL EVERY 8 HOURS PRN
Qty: 10 TABLET | Refills: 0 | Status: SHIPPED | OUTPATIENT
Start: 2024-01-12 | End: 2024-04-15 | Stop reason: SDUPTHER

## 2024-01-12 RX ORDER — LEVOTHYROXINE SODIUM 25 UG/1
25 TABLET ORAL DAILY
Qty: 90 TABLET | Refills: 3 | Status: SHIPPED | OUTPATIENT
Start: 2024-01-12

## 2024-01-12 RX ORDER — ROSUVASTATIN CALCIUM 5 MG/1
5 TABLET, COATED ORAL DAILY
Qty: 90 TABLET | Refills: 3 | Status: SHIPPED | OUTPATIENT
Start: 2024-01-12 | End: 2025-01-11

## 2024-01-12 RX ORDER — LISINOPRIL 2.5 MG/1
2.5 TABLET ORAL DAILY
Qty: 90 TABLET | Refills: 3 | Status: SHIPPED | OUTPATIENT
Start: 2024-01-12 | End: 2024-04-15 | Stop reason: SINTOL

## 2024-01-12 ASSESSMENT — ENCOUNTER SYMPTOMS
BLOOD IN STOOL: 1
EYES NEGATIVE: 1
RESPIRATORY NEGATIVE: 1
NEUROLOGICAL NEGATIVE: 1
ABDOMINAL PAIN: 0
NAUSEA: 0
BACK PAIN: 1
HEMATOLOGIC/LYMPHATIC NEGATIVE: 1
CARDIOVASCULAR NEGATIVE: 1
NERVOUS/ANXIOUS: 1
ABDOMINAL DISTENTION: 0
CONSTITUTIONAL NEGATIVE: 1

## 2024-01-12 NOTE — PROGRESS NOTES
"Subjective   Patient ID: Zoraida Mejia is a 78 y.o. female who presents for 6 week follow up .    Here to follow up on medical problems HTN,HLD,Hypothyroid,anxiety,osteoporosis with vertebral Fx.  She started Sertraline and not helped,wants to get off it,\"in past it did not help\". Sometimes she takes Hydroxyzine,but in past she responded to Ativan,wants a very small Rx.  she has ,HLD, depression,anxiety, hypothyroid,osteoporosis,tubular adenoma,GERD,h/o ovarian cyst,h/o kidney stone .taking her meds regularly,no side effects.She also has osteoporosis,was on Bisphosphonate for 5 years then on drug holiday,her DEXA showed more bone loss,she refused to start Prolia due to high cost and co pay,but she is currently on IV Reclast and was covered , she will will be due in May 2024.  She also has her usual  LBP with radiation to both legs posterior aspect, but improved.  She had history of multilevel degenerative disc disease lumbar spine and L2 vertebral compression fracture few months ago,seen by [facility],ortho and pain management.she saw ortho,pain management,,had 1 nerve block with some improvement but it came back and has an appt with Dr Masters.  No urinary symptoms or incontinence.no legs weakness.  She denies any depression,.           Review of Systems   Constitutional: Negative.    HENT: Negative.     Eyes: Negative.    Respiratory: Negative.     Cardiovascular: Negative.    Gastrointestinal:  Positive for blood in stool. Negative for abdominal distention, abdominal pain and nausea.        And change constipation.   Genitourinary: Negative.    Musculoskeletal:  Positive for back pain.   Neurological: Negative.    Hematological: Negative.    Psychiatric/Behavioral:  The patient is nervous/anxious.        Objective   /78 (BP Location: Left arm, Patient Position: Sitting, BP Cuff Size: Adult)   Pulse 74   Temp 36.7 °C (98 °F) (Temporal)   Ht 1.549 m (5' 1\")   Wt 54.3 kg (119 lb 9.6 oz)   SpO2 99%   BMI " 22.60 kg/m²     Physical Exam  Constitutional:       Appearance: Normal appearance.   HENT:      Head: Normocephalic and atraumatic.   Eyes:      Extraocular Movements: Extraocular movements intact.      Pupils: Pupils are equal, round, and reactive to light.   Cardiovascular:      Rate and Rhythm: Normal rate and regular rhythm.      Heart sounds: Normal heart sounds.   Pulmonary:      Effort: Pulmonary effort is normal.      Breath sounds: Normal breath sounds. No wheezing or rhonchi.   Abdominal:      General: Abdomen is flat. Bowel sounds are normal. There is no distension.      Palpations: Abdomen is soft.   Musculoskeletal:         General: Normal range of motion.      Cervical back: Normal range of motion and neck supple.      Right lower leg: No edema.      Left lower leg: No edema.   Skin:     General: Skin is warm.   Neurological:      General: No focal deficit present.      Mental Status: She is alert and oriented to person, place, and time.   Psychiatric:         Mood and Affect: Mood normal.         Behavior: Behavior normal.         Assessment/Plan   Problem List Items Addressed This Visit             ICD-10-CM    Anxiety F41.9     Did not respond to SSRI.  Continue continue hydroxyzine as needed.  Small prescription for benzodiazepine, OARRS report checked and verified.  Consider buspirone if not better.         Relevant Medications    LORazepam (Ativan) 0.5 mg tablet    Hyperlipidemia E78.5     Stable on Crestor.         Relevant Medications    rosuvastatin (Crestor) 5 mg tablet    Hypothyroidism E03.9     Stable on levothyroxine.         Relevant Medications    levothyroxine (Synthroid, Levoxyl) 25 mcg tablet    Constipation K59.00     Colonoscopy up-to-date.  Continue MiraLAX and fiber.         Compression fracture of L2 lumbar vertebra (CMS/HCC) S32.020A     Pain improved.  Continue treating her osteoporosis with IV Reclast, calcium and vitamin D.  Fall prevention.  Weightbearing exercise.          Primary hypertension - Primary I10     Stable on lisinopril.         Relevant Medications    lisinopril 2.5 mg tablet

## 2024-01-13 NOTE — ASSESSMENT & PLAN NOTE
Pain improved.  Continue treating her osteoporosis with IV Reclast, calcium and vitamin D.  Fall prevention.  Weightbearing exercise.

## 2024-01-13 NOTE — ASSESSMENT & PLAN NOTE
Did not respond to SSRI.  Continue continue hydroxyzine as needed.  Small prescription for benzodiazepine, OARRS report checked and verified.  Consider buspirone if not better.

## 2024-02-21 ENCOUNTER — ANESTHESIA EVENT (OUTPATIENT)
Dept: OPERATING ROOM | Facility: CLINIC | Age: 79
End: 2024-02-21
Payer: MEDICARE

## 2024-02-21 ASSESSMENT — ENCOUNTER SYMPTOMS
CONSTITUTIONAL NEGATIVE: 1
ENDOCRINE NEGATIVE: 1
RESPIRATORY NEGATIVE: 1
NEUROLOGICAL NEGATIVE: 1
GASTROINTESTINAL NEGATIVE: 1
EYES NEGATIVE: 1
HEMATOLOGIC/LYMPHATIC NEGATIVE: 1

## 2024-02-22 ENCOUNTER — HOSPITAL ENCOUNTER (OUTPATIENT)
Dept: OPERATING ROOM | Facility: CLINIC | Age: 79
Discharge: HOME | End: 2024-02-22
Payer: MEDICARE

## 2024-02-22 ENCOUNTER — ANESTHESIA (OUTPATIENT)
Dept: OPERATING ROOM | Facility: CLINIC | Age: 79
End: 2024-02-22
Payer: MEDICARE

## 2024-02-22 VITALS
TEMPERATURE: 97.2 F | WEIGHT: 117.06 LBS | RESPIRATION RATE: 18 BRPM | BODY MASS INDEX: 22.1 KG/M2 | OXYGEN SATURATION: 98 % | HEART RATE: 83 BPM | DIASTOLIC BLOOD PRESSURE: 58 MMHG | HEIGHT: 61 IN | SYSTOLIC BLOOD PRESSURE: 118 MMHG

## 2024-02-22 DIAGNOSIS — K63.5 POLYP OF COLON, UNSPECIFIED PART OF COLON, UNSPECIFIED TYPE: ICD-10-CM

## 2024-02-22 PROCEDURE — 2500000005 HC RX 250 GENERAL PHARMACY W/O HCPCS: Performed by: NURSE ANESTHETIST, CERTIFIED REGISTERED

## 2024-02-22 PROCEDURE — 7100000009 HC PHASE TWO TIME - INITIAL BASE CHARGE: Performed by: NURSE ANESTHETIST, CERTIFIED REGISTERED

## 2024-02-22 PROCEDURE — 7100000010 HC PHASE TWO TIME - EACH INCREMENTAL 1 MINUTE: Performed by: NURSE ANESTHETIST, CERTIFIED REGISTERED

## 2024-02-22 PROCEDURE — 3600000007 HC OR TIME - EACH INCREMENTAL 1 MINUTE - PROCEDURE LEVEL TWO: Performed by: NURSE ANESTHETIST, CERTIFIED REGISTERED

## 2024-02-22 PROCEDURE — 3600000002 HC OR TIME - INITIAL BASE CHARGE - PROCEDURE LEVEL TWO: Performed by: NURSE ANESTHETIST, CERTIFIED REGISTERED

## 2024-02-22 PROCEDURE — 45378 DIAGNOSTIC COLONOSCOPY: CPT | Performed by: SURGERY

## 2024-02-22 PROCEDURE — 3700000002 HC GENERAL ANESTHESIA TIME - EACH INCREMENTAL 1 MINUTE: Performed by: NURSE ANESTHETIST, CERTIFIED REGISTERED

## 2024-02-22 PROCEDURE — A45378 PR COLONOSCOPY,DIAGNOSTIC: Performed by: NURSE ANESTHETIST, CERTIFIED REGISTERED

## 2024-02-22 PROCEDURE — 2500000004 HC RX 250 GENERAL PHARMACY W/ HCPCS (ALT 636 FOR OP/ED): Performed by: NURSE ANESTHETIST, CERTIFIED REGISTERED

## 2024-02-22 PROCEDURE — 3700000001 HC GENERAL ANESTHESIA TIME - INITIAL BASE CHARGE: Performed by: NURSE ANESTHETIST, CERTIFIED REGISTERED

## 2024-02-22 RX ORDER — LIDOCAINE IN NACL,ISO-OSMOT/PF 30 MG/3 ML
0.1 SYRINGE (ML) INJECTION ONCE
Status: DISCONTINUED | OUTPATIENT
Start: 2024-02-22 | End: 2024-02-23 | Stop reason: HOSPADM

## 2024-02-22 RX ORDER — PROPOFOL 10 MG/ML
INJECTION, EMULSION INTRAVENOUS CONTINUOUS PRN
Status: DISCONTINUED | OUTPATIENT
Start: 2024-02-22 | End: 2024-02-22

## 2024-02-22 RX ORDER — PROPOFOL 10 MG/ML
INJECTION, EMULSION INTRAVENOUS AS NEEDED
Status: DISCONTINUED | OUTPATIENT
Start: 2024-02-22 | End: 2024-02-22

## 2024-02-22 RX ORDER — SODIUM CHLORIDE, SODIUM LACTATE, POTASSIUM CHLORIDE, CALCIUM CHLORIDE 600; 310; 30; 20 MG/100ML; MG/100ML; MG/100ML; MG/100ML
INJECTION, SOLUTION INTRAVENOUS CONTINUOUS PRN
Status: DISCONTINUED | OUTPATIENT
Start: 2024-02-22 | End: 2024-02-22

## 2024-02-22 RX ORDER — ONDANSETRON HYDROCHLORIDE 2 MG/ML
4 INJECTION, SOLUTION INTRAVENOUS ONCE AS NEEDED
Status: DISCONTINUED | OUTPATIENT
Start: 2024-02-22 | End: 2024-02-23 | Stop reason: HOSPADM

## 2024-02-22 RX ORDER — LIDOCAINE HYDROCHLORIDE 20 MG/ML
INJECTION, SOLUTION EPIDURAL; INFILTRATION; INTRACAUDAL; PERINEURAL AS NEEDED
Status: DISCONTINUED | OUTPATIENT
Start: 2024-02-22 | End: 2024-02-22

## 2024-02-22 RX ORDER — SODIUM CHLORIDE, SODIUM LACTATE, POTASSIUM CHLORIDE, CALCIUM CHLORIDE 600; 310; 30; 20 MG/100ML; MG/100ML; MG/100ML; MG/100ML
100 INJECTION, SOLUTION INTRAVENOUS CONTINUOUS
Status: DISCONTINUED | OUTPATIENT
Start: 2024-02-22 | End: 2024-02-23 | Stop reason: HOSPADM

## 2024-02-22 RX ADMIN — LIDOCAINE HYDROCHLORIDE 60 MG: 20 INJECTION, SOLUTION EPIDURAL; INFILTRATION; INTRACAUDAL; PERINEURAL at 09:29

## 2024-02-22 RX ADMIN — PROPOFOL 50 MG: 10 INJECTION, EMULSION INTRAVENOUS at 09:29

## 2024-02-22 RX ADMIN — SODIUM CHLORIDE, POTASSIUM CHLORIDE, SODIUM LACTATE AND CALCIUM CHLORIDE: 600; 310; 30; 20 INJECTION, SOLUTION INTRAVENOUS at 09:21

## 2024-02-22 RX ADMIN — PROPOFOL 200 MCG/KG/MIN: 10 INJECTION, EMULSION INTRAVENOUS at 09:29

## 2024-02-22 ASSESSMENT — PAIN - FUNCTIONAL ASSESSMENT
PAIN_FUNCTIONAL_ASSESSMENT: 0-10

## 2024-02-22 ASSESSMENT — PAIN SCALES - GENERAL
PAIN_LEVEL: 0
PAINLEVEL_OUTOF10: 0 - NO PAIN

## 2024-02-22 NOTE — H&P
History Of Present Illness  Zoraida Mejia is a 78 y.o. female presenting with health maintenance.     Past Medical History  Past Medical History:   Diagnosis Date    Other conditions influencing health status 12/02/2013    Tongue Lesion Mass (___ Cm)    Personal history of other diseases of the nervous system and sense organs     History of cataract    Personal history of other diseases of the respiratory system 04/16/2013    History of acute pharyngitis    Personal history of urinary calculi 09/21/2015    History of renal calculi    Personal history of urinary calculi 03/13/2017    History of kidney stones       Surgical History  Past Surgical History:   Procedure Laterality Date    ANKLE SURGERY  02/16/2014    Ankle Surgery    CATARACT EXTRACTION  01/28/2014    Cataract Surgery    OTHER SURGICAL HISTORY  04/16/2013    Breast Surgery Enlargement Procedure    OTHER SURGICAL HISTORY  01/28/2014    Uterine Myomectomy    OTHER SURGICAL HISTORY  01/28/2014    Renal Endoscopy Through Nephrostomy With Calculus Removal    OTHER SURGICAL HISTORY  01/28/2014    Treatment Of Ankle Fracture    RETINAL DETACHMENT SURGERY  01/28/2014    Repair Of Retinal Detachment        Social History  She reports that she has never smoked. She has been exposed to tobacco smoke. She has never used smokeless tobacco. She reports current alcohol use. She reports that she does not use drugs.    Family History  Family History   Problem Relation Name Age of Onset    Heart disease Mother          cardiac disorder        Allergies  Patient has no known allergies.    Review of Systems   Constitutional: Negative.    HENT: Negative.     Eyes: Negative.    Respiratory: Negative.     Gastrointestinal: Negative.    Endocrine: Negative.    Genitourinary: Negative.    Skin: Negative.    Neurological: Negative.    Hematological: Negative.         Physical Exam  Constitutional:       General: She is not in acute distress.     Appearance: Normal appearance.    HENT:      Head: Normocephalic and atraumatic.   Eyes:      Extraocular Movements: Extraocular movements intact.   Cardiovascular:      Rate and Rhythm: Normal rate and regular rhythm.      Heart sounds: No murmur heard.  Pulmonary:      Effort: Pulmonary effort is normal.      Breath sounds: Normal breath sounds.   Abdominal:      General: There is no distension.      Palpations: Abdomen is soft.      Tenderness: There is no abdominal tenderness.   Musculoskeletal:      Cervical back: Neck supple.   Skin:     General: Skin is warm and dry.   Neurological:      Mental Status: She is alert and oriented to person, place, and time.   Psychiatric:         Mood and Affect: Mood normal.         Behavior: Behavior normal.          Last Recorded Vitals  There were no vitals taken for this visit.    Relevant Results           Assessment/Plan   78-year-old woman with history of tubular adenoma who presents for routine screening colonoscopy.    - Consent signed       I spent 5 minutes in the professional and overall care of this patient.      Charla Mercado MD

## 2024-02-22 NOTE — DISCHARGE INSTRUCTIONS
During the first 24 hours after your procedure, you should:    - Resume normal diet, unless otherwise directed by your doctor.  - Resume your home medications, unless otherwise directed by your doctor.  - Refrain from driving or operating heavy machinery.  - Drink plenty of liquids.  - Avoid consuming alcohol.  - Avoid strenuous activity or heavy lifting.    After 24 hours, you can resume regular activity.    Call your doctor office immediately ([phone]) or come to the nearest emergency room if you experience:    - Abdominal tenderness  - Blood in your stool or vomit  - Difficulty urinating or passing stools  - Difficulty breathing  - Chest pain  - Fever    Araceli Nuñez MD  Division of Colon and Rectal Surgery  [phone]

## 2024-02-22 NOTE — ANESTHESIA POSTPROCEDURE EVALUATION
Patient: Zoraida Mejia    Procedure Summary       Date: 02/22/24 Room / Location: [facility] OR    Anesthesia Start: 0924 Anesthesia Stop: 1012    Procedure: COLONOSCOPY Diagnosis: Polyp of colon, unspecified part of colon, unspecified type    Scheduled Providers: Araceli Nuñez MD Responsible Provider: JUSTA Montoya    Anesthesia Type: MAC ASA Status: 2            Anesthesia Type: MAC    Vitals Value Taken Time   BP 82/50 02/22/24 1012   Temp 36.2 °C (97.2 °F) 02/22/24 1012   Pulse 75 02/22/24 1012   Resp 16 02/22/24 1012   SpO2 99 % 02/22/24 1012       Anesthesia Post Evaluation    Patient location during evaluation: PACU  Patient participation: complete - patient participated  Level of consciousness: awake  Pain score: 0  Pain management: adequate  Airway patency: patent  Cardiovascular status: acceptable  Respiratory status: acceptable  Hydration status: acceptable  Postoperative Nausea and Vomiting: none        There were no known notable events for this encounter.

## 2024-02-22 NOTE — ANESTHESIA PREPROCEDURE EVALUATION
Patient: Zoraida Mejia    Procedure Information       Date/Time: 02/22/24 3160    Scheduled providers: Araceli Nuñez MD    Procedure: COLONOSCOPY    Location: [facility] OR            Relevant Problems   Cardiovascular   (+) Hyperlipidemia   (+) Primary hypertension      Endocrine   (+) Hypothyroidism      GI   (+) GERD without esophagitis      Neuro/Psych   (+) Anxiety      Pulmonary   (+) Dyspnea on exertion      Musculoskeletal   (+) Degenerative disc disease, lumbar      Eyes, Ears, Nose, and Throat   (+) Bilateral high frequency sensorineural hearing loss   (+) Hearing loss       Clinical information reviewed:   Tobacco  Allergies  Meds  Problems  Med Hx  Surg Hx   Fam Hx  Soc   Hx        NPO Detail:  No data recorded     Physical Exam    Airway  Mallampati: II  TM distance: >3 FB  Neck ROM: full     Cardiovascular - normal exam     Dental    Pulmonary - normal exam     Abdominal - normal exam             Anesthesia Plan    History of general anesthesia?: yes  History of complications of general anesthesia?: no    ASA 2     MAC     intravenous induction   Anesthetic plan and risks discussed with patient.

## 2024-03-06 ENCOUNTER — APPOINTMENT (OUTPATIENT)
Dept: ORTHOPEDIC SURGERY | Facility: CLINIC | Age: 79
End: 2024-03-06
Payer: MEDICARE

## 2024-03-07 ENCOUNTER — APPOINTMENT (OUTPATIENT)
Dept: ORTHOPEDIC SURGERY | Facility: CLINIC | Age: 79
End: 2024-03-07
Payer: MEDICARE

## 2024-03-12 ENCOUNTER — APPOINTMENT (OUTPATIENT)
Dept: DERMATOLOGY | Facility: CLINIC | Age: 79
End: 2024-03-12
Payer: MEDICARE

## 2024-03-13 ENCOUNTER — OFFICE VISIT (OUTPATIENT)
Dept: DERMATOLOGY | Facility: CLINIC | Age: 79
End: 2024-03-13
Payer: MEDICARE

## 2024-03-13 DIAGNOSIS — L82.1 SEBORRHEIC KERATOSIS: ICD-10-CM

## 2024-03-13 DIAGNOSIS — D18.01 HEMANGIOMA OF SKIN: ICD-10-CM

## 2024-03-13 DIAGNOSIS — L81.4 LENTIGO: ICD-10-CM

## 2024-03-13 DIAGNOSIS — Z12.83 ENCOUNTER FOR SCREENING FOR MALIGNANT NEOPLASM OF SKIN: ICD-10-CM

## 2024-03-13 DIAGNOSIS — L90.5 SCAR CONDITIONS AND FIBROSIS OF SKIN: Primary | ICD-10-CM

## 2024-03-13 PROCEDURE — 1159F MED LIST DOCD IN RCRD: CPT | Performed by: DERMATOLOGY

## 2024-03-13 PROCEDURE — 1157F ADVNC CARE PLAN IN RCRD: CPT | Performed by: DERMATOLOGY

## 2024-03-13 PROCEDURE — 99213 OFFICE O/P EST LOW 20 MIN: CPT | Performed by: DERMATOLOGY

## 2024-03-13 PROCEDURE — 1036F TOBACCO NON-USER: CPT | Performed by: DERMATOLOGY

## 2024-03-13 PROCEDURE — 1160F RVW MEDS BY RX/DR IN RCRD: CPT | Performed by: DERMATOLOGY

## 2024-03-13 ASSESSMENT — DERMATOLOGY PATIENT ASSESSMENT
ARE YOU ON BIRTH CONTROL: NO
DO YOU USE SUNSCREEN: OCCASIONALLY
ARE YOU AN ORGAN TRANSPLANT RECIPIENT: NO
DO YOU USE A TANNING BED: NO
ARE YOU TRYING TO GET PREGNANT: NO
DO YOU HAVE ANY NEW OR CHANGING LESIONS: YES
DO YOU HAVE IRREGULAR MENSTRUAL CYCLES: NO
HAVE YOU HAD OR DO YOU HAVE A STAPH INFECTION: NO
HAVE YOU HAD OR DO YOU HAVE VASCULAR DISEASE: NO
WHERE ARE THESE NEW OR CHANGING LESIONS LOCATED: FACE

## 2024-03-13 ASSESSMENT — DERMATOLOGY QUALITY OF LIFE (QOL) ASSESSMENT
WHAT SINGLE SKIN CONDITION LISTED BELOW IS THE PATIENT ANSWERING THE QUALITY-OF-LIFE ASSESSMENT QUESTIONS ABOUT: NONE OF THE ABOVE
RATE HOW BOTHERED YOU ARE BY SYMPTOMS OF YOUR SKIN PROBLEM (EG, ITCHING, STINGING BURNING, HURTING OR SKIN IRRITATION): 0 - NEVER BOTHERED
DATE THE QUALITY-OF-LIFE ASSESSMENT WAS COMPLETED: 66912
RATE HOW BOTHERED YOU ARE BY EFFECTS OF YOUR SKIN PROBLEMS ON YOUR ACTIVITIES (EG, GOING OUT, ACCOMPLISHING WHAT YOU WANT, WORK ACTIVITIES OR YOUR RELATIONSHIPS WITH OTHERS): 0 - NEVER BOTHERED
RATE HOW EMOTIONALLY BOTHERED YOU ARE BY YOUR SKIN PROBLEM (FOR EXAMPLE, WORRY, EMBARRASSMENT, FRUSTRATION): 0 - NEVER BOTHERED
ARE THERE EXCLUSIONS OR EXCEPTIONS FOR THE QUALITY OF LIFE ASSESSMENT: NO

## 2024-03-13 ASSESSMENT — PATIENT GLOBAL ASSESSMENT (PGA): PATIENT GLOBAL ASSESSMENT: PATIENT GLOBAL ASSESSMENT:  1 - CLEAR

## 2024-03-13 ASSESSMENT — ITCH NUMERIC RATING SCALE: HOW SEVERE IS YOUR ITCHING?: 0

## 2024-03-13 NOTE — PROGRESS NOTES
Subjective   Zoraida Mejia is a 78 y.o. female who presents for the following: Skin Check (Annual).    Skin Cancer Screening  She has a history of moderate sun exposure. She is in the sun occasionally. She uses sunscreen occasionally. She reports no skin symptoms. Her moles are not changing.    Spots that concern her: Face      History of NMSC(s)/Dysplastic Nevi    1: Atypical Compound Melanocytic Neoplasm  Location: Right Upper Arm  Biopsy Date:  October 2019  Pathology:  [id number]  Treatment: excised with 3 mm margins      Objective   Well appearing patient in no apparent distress; mood and affect are within normal limits.    A full examination was performed including scalp, head, eyes, ears, nose, lips, neck, chest, axillae, abdomen, back, buttocks, bilateral upper extremities, bilateral lower extremities, hands, feet, fingers, toes, fingernails, and toenails. All findings within normal limits unless otherwise noted below.    Scattered cherry-red papule(s).    Scattered tan macules in sun-exposed areas.    Stuck on verrucous, tan-brown papules and plaques.      Scar is well-healed without evidence of recurrence      Assessment/Plan   Hemangioma of skin    Lentigo    The ABCDEs of melanoma and warning signs of non-melanoma skin cancer were discussed with patient and patient expressed understanding. Sun protection and use of at least SPF 30 discussed with patient. Pt instructed to reapply every 2 hours.     Seborrheic keratosis    The benign nature of the diagnosis was explained to patient. Risks, benefits, side effects, alternatives and options were discussed with patient and the patient voiced understanding.    ISK on L cheek treated x 2 cycles with LN2; wound care d/w pt and pt expressed understanding.       Scar conditions and fibrosis of skin    Related Procedures  Follow Up In Dermatology - Established Patient    Encounter for screening for malignant neoplasm of skin    Follow up in 1 year or sooner as  needed.

## 2024-04-03 ENCOUNTER — TELEPHONE (OUTPATIENT)
Dept: PRIMARY CARE | Facility: CLINIC | Age: 79
End: 2024-04-03
Payer: MEDICARE

## 2024-04-09 ENCOUNTER — OFFICE VISIT (OUTPATIENT)
Dept: DERMATOLOGY | Facility: CLINIC | Age: 79
End: 2024-04-09

## 2024-04-09 ENCOUNTER — OFFICE VISIT (OUTPATIENT)
Dept: RHEUMATOLOGY | Facility: CLINIC | Age: 79
End: 2024-04-09
Payer: MEDICARE

## 2024-04-09 VITALS
OXYGEN SATURATION: 97 % | WEIGHT: 119.2 LBS | HEART RATE: 71 BPM | SYSTOLIC BLOOD PRESSURE: 138 MMHG | BODY MASS INDEX: 22.51 KG/M2 | TEMPERATURE: 97.4 F | HEIGHT: 61 IN | DIASTOLIC BLOOD PRESSURE: 88 MMHG

## 2024-04-09 DIAGNOSIS — L98.8 WRINKLES: ICD-10-CM

## 2024-04-09 DIAGNOSIS — M80.00XD OSTEOPOROSIS WITH CURRENT PATHOLOGICAL FRACTURE WITH ROUTINE HEALING, UNSPECIFIED OSTEOPOROSIS TYPE, SUBSEQUENT ENCOUNTER: ICD-10-CM

## 2024-04-09 DIAGNOSIS — M81.0 AGE-RELATED OSTEOPOROSIS WITHOUT CURRENT PATHOLOGICAL FRACTURE: Primary | ICD-10-CM

## 2024-04-09 PROCEDURE — 99214 OFFICE O/P EST MOD 30 MIN: CPT | Performed by: INTERNAL MEDICINE

## 2024-04-09 PROCEDURE — PBTXA ADMINISTRATION FEE COSMETIC: Performed by: DERMATOLOGY

## 2024-04-09 PROCEDURE — 1159F MED LIST DOCD IN RCRD: CPT | Performed by: INTERNAL MEDICINE

## 2024-04-09 PROCEDURE — 1160F RVW MEDS BY RX/DR IN RCRD: CPT | Performed by: INTERNAL MEDICINE

## 2024-04-09 PROCEDURE — 1160F RVW MEDS BY RX/DR IN RCRD: CPT | Performed by: DERMATOLOGY

## 2024-04-09 PROCEDURE — 1158F ADVNC CARE PLAN TLK DOCD: CPT | Performed by: INTERNAL MEDICINE

## 2024-04-09 PROCEDURE — 3079F DIAST BP 80-89 MM HG: CPT | Performed by: INTERNAL MEDICINE

## 2024-04-09 PROCEDURE — BOTOX BOTOX 1 UNIT: Performed by: DERMATOLOGY

## 2024-04-09 PROCEDURE — 1157F ADVNC CARE PLAN IN RCRD: CPT | Performed by: DERMATOLOGY

## 2024-04-09 PROCEDURE — 1036F TOBACCO NON-USER: CPT | Performed by: DERMATOLOGY

## 2024-04-09 PROCEDURE — 1157F ADVNC CARE PLAN IN RCRD: CPT | Performed by: INTERNAL MEDICINE

## 2024-04-09 PROCEDURE — 1159F MED LIST DOCD IN RCRD: CPT | Performed by: DERMATOLOGY

## 2024-04-09 PROCEDURE — 3075F SYST BP GE 130 - 139MM HG: CPT | Performed by: INTERNAL MEDICINE

## 2024-04-09 PROCEDURE — 1123F ACP DISCUSS/DSCN MKR DOCD: CPT | Performed by: INTERNAL MEDICINE

## 2024-04-09 ASSESSMENT — DERMATOLOGY QUALITY OF LIFE (QOL) ASSESSMENT
RATE HOW BOTHERED YOU ARE BY SYMPTOMS OF YOUR SKIN PROBLEM (EG, ITCHING, STINGING BURNING, HURTING OR SKIN IRRITATION): 0 - NEVER BOTHERED
RATE HOW EMOTIONALLY BOTHERED YOU ARE BY YOUR SKIN PROBLEM (FOR EXAMPLE, WORRY, EMBARRASSMENT, FRUSTRATION): 0 - NEVER BOTHERED
RATE HOW BOTHERED YOU ARE BY EFFECTS OF YOUR SKIN PROBLEMS ON YOUR ACTIVITIES (EG, GOING OUT, ACCOMPLISHING WHAT YOU WANT, WORK ACTIVITIES OR YOUR RELATIONSHIPS WITH OTHERS): 0 - NEVER BOTHERED
WHAT SINGLE SKIN CONDITION LISTED BELOW IS THE PATIENT ANSWERING THE QUALITY-OF-LIFE ASSESSMENT QUESTIONS ABOUT: NONE OF THE ABOVE
ARE THERE EXCLUSIONS OR EXCEPTIONS FOR THE QUALITY OF LIFE ASSESSMENT: NO

## 2024-04-09 ASSESSMENT — DERMATOLOGY PATIENT ASSESSMENT
DO YOU USE A TANNING BED: NO
DO YOU USE SUNSCREEN: OCCASIONALLY
ARE YOU AN ORGAN TRANSPLANT RECIPIENT: NO
FOR PATIENTS COMING IN FOR A FOLLOW-UP VISIT - HAVE THERE BEEN ANY CHANGES IN YOUR HEALTH SINCE YOUR LAST VISIT: NO
DO YOU HAVE ANY NEW OR CHANGING LESIONS: NO

## 2024-04-09 ASSESSMENT — PATIENT GLOBAL ASSESSMENT (PGA): PATIENT GLOBAL ASSESSMENT: PATIENT GLOBAL ASSESSMENT:  1 - CLEAR

## 2024-04-09 ASSESSMENT — ITCH NUMERIC RATING SCALE: HOW SEVERE IS YOUR ITCHING?: 0

## 2024-04-09 NOTE — PATIENT INSTRUCTIONS
We will order Reclast for June 2024.  Please premedicate yourself with tylenol 650 mg 30-60 minutes before infusion.  Please make dure you are well hydrated before infusion.  Check labs about 1 week before infusion: CMP, 25-hydroxy Vitamin D.  Continue vitamin D 1000 international units daily.  Try to get calcium 1200 mg total daily (diet plus supplements).  Continue weight bearing exercise.  Follow up in 1 year.

## 2024-04-09 NOTE — PROGRESS NOTES
Subjective     Zoraida Mejia is a 78 y.o. female who presents for the following: Cosmetic (Pt here for Botox.  11/7/2023 49 units total injected=Glabella-25(7, 6, 6, 3, 3), Frontalis-6, Perioral-4, Periorbital-14(8R, 6L). Per pt may need more, or same amount injected/divided differently. Pt was happy with results overall. ).     Review of Systems:  No other skin or systemic complaints other than what is documented elsewhere in the note.    The following portions of the chart were reviewed this encounter and updated as appropriate:          Skin Cancer History  No skin cancer on file.      Specialty Problems          Dermatology Problems    Facial rhytids    Multiple nevi    Melanocytic nevi of other parts of face    Melanocytic nevi of right lower limb, including hip    Melanocytic nevi of trunk    Other melanin hyperpigmentation    Other seborrheic keratosis    Scar condition and fibrosis of skin        Objective   Well appearing patient in no apparent distress; mood and affect are within normal limits.    A focused skin examination was performed of the face. All findings within normal limits unless otherwise noted below.    Assessment/Plan   1. Wrinkles (14)  Glabella, Left Forehead (3), Left Yazidi, Mid Forehead (3), Philtrum, Right Forehead (4), Right Zygomatic Area  Dynamic and static rhytids    Botox is a neurotoxin which prevents muscles from whitley that can help soften and smooth fine lines/wrinkles.  Risks and benefits were discussed bruising and swelling, lid droop, dropping of eyebrow, asymmetrical smile. For continued improvement ongoing treatment with Botox will soften lines  Botox typically begins to work 3 days after injection, full effect by 14 days. Typically lasts about 3 months. Continued treatments will continue to help lines to soften over time. However, lines may not completely disappear.  After treatment do not lie flat for 4 hours and do not exercise for 24 hours after the  procedure.    We briefly discussed volbella for the lips - 0.55cc syringe, pt is hesitant given downtime and swelling.    Chemodenervation - Glabella, Left Forehead (3), Left Buddhism, Mid Forehead (3), Philtrum, Right Forehead (4), Right Zygomatic Area    Date/Time: 4/9/2024 8:36 AM    Performed by: Susy Gracia DO  Authorized by: Susy Gracia DO      Cosmetic:  yesnot medical botulinum toxin injection    Consent:      Consent obtained:  Written     Consent given by:  Patient     Risks discussed:  Poor cosmetic result     Alternatives discussed:  No treatment    Universal protocol:      Relevant documents present and verified:  Yes       Site/side verified:  Yes       Patient identity confirmed:  Verbally with patient    Pre-procedure details:   Prep type:  Isopropyl alcohol    Procedure details:      Diluted by:  Preservative free saline     Toxin (Brand):  OnaBoNT-A (Botox)     Concentration (u/mL):  2U/01mL      Post-procedure details:      Patient tolerance of procedure:  Tolerated well, no immediate complications    Comments: Units injected:51  Glabella: 25  Periorbital: 16 (8,8)  Perioral: 4  Forehead: 6 - Right: 4, Left: 2      botulinum toxin Type A (Cosm) (Botox) injection 51 Units - Glabella, Left Forehead (3), Left Buddhism, Mid Forehead (3), Philtrum, Right Forehead (4), Right Zygomatic Area      Related Procedures  Follow Up In Dermatology - Established Patient  Follow Up In Dermatology - Established Patient        Follow up in 3 months for botox

## 2024-04-09 NOTE — PROGRESS NOTES
Subjective   Patient ID: Zoraida Mejia is a 78 y.o. female who presents for Follow-up.    HPI 78-year-old female here for follow-up regarding osteoporosis.    He has a history of L2 compression fracture January 2023, which was treated with kyphoplasty February 2023.  She also fractured 5 bones in her left foot when she was around 50 years of age, when she was stepping off a chair and stepped on a can and rolled her foot.     Last menstrual period was in her late 40s early 50s.  She took hormone replacement therapy for 10 to 12 years.  She took Boniva monthly for about 5 years, stopped it in 2021.  She does not take calcium on a regular basis because of her constipation issue.  She does not take vitamin D on a regular basis.  Patient is , with weight of 116 pounds (BMI 21.7)     She resumed Boniva 150 mg orally once monthly 12/22 after DEXA 11/22 again showed osteoporosis, T score -2.8 in the left total hip and femoral neck.     She started Reclast June 6, 2023.  She received infusion without any side effects.    She has not had any further fractures in the last year.  She also takes vitamin D 1000 international units daily.    She is also having less back pain and she was in 2023.  She had a midline lumbar epidural steroid injection at L5-S1 June 1, 2023 which helped.     DEXA November 2022: T score -2.8 left total hip (stable), T score -2.8 left femoral neck (stable), T score -1.3 lumbar spine (increased 6.8%)     Labs 10/22: TSH 3.77, CMP normal except GFR 59  Labs December 2022: BMP normal except sodium 133 and glucose 142  Labs January 2023: Liver enzymes normal, cholesterol 197, HDL 56, , triglycerides 130, CK 48  Labs May 2023: ELIGIO negative, rheumatoid factor negative, Citrulline antibody negative, 25-hydroxy vitamin D 34, ESR 5, SPEP normal, CMP normal  Labs January 2024: CK 45, hepatic function panel normal, cholesterol 178, HDL 78, LDL 80, triglycerides 97    MRI LS spine May 2023:  "Chronic compression fracture at L2 with retropulsion of fragments at this level indenting the ventral sac and possibly irritating the traversing L2 nerve roots.     Medical problem list:   -Hypothyroidism   -Lumbar spondylosis   -Osteoporosis- h/o L2 compression fracture 12/22.   - h/o left fibula fracture 2010 (had ORIF)   - h/o metatarsal fractures left foot      Medications: Reviewed as documented  Surgeries: Myomectomy 1998    Bilateral cataract surgery    Breast augmentation    Retinal detachment    ORIF left fibula fracture 2010  Kyphoplasty at L2 Feb 2023     NKDA     Social history: .   Denies use of tobacco.   Drinks alcohol [id number] alcoholic drinks per week.     Family history: Mother passed away at age 76 from MI.   No family history of osteoporosis or hip fracture.    Review of Systems  General: Denies fevers or chills.  CV: Denies chest pain or palpitations.  Denies leg edema.  Lungs: Denies coughing or shortness of breath.  Skin: Denies rashes or nodules.  MS: Denies joint pain or joint swelling.     Objective   /88 (BP Location: Left arm, Patient Position: Sitting, BP Cuff Size: Small adult)   Pulse 71   Temp 36.3 °C (97.4 °F)   Ht 1.549 m (5' 1\")   Wt 54.1 kg (119 lb 3.2 oz)   SpO2 97%   BMI 22.52 kg/m²     Physical Exam  HEENT: PERRL, EOMI  Neck: Supple, no nodes.  CV: RRR, no MGR.  Lungs: Clear, no rales or wheezes.  Abdomen: Soft, nontender. No hepatosplenomegaly.  Extremities:  No cyanosis, clubbing, or edema.  MS: No synovitis.  Skin: No rashes or nodules.      Assessment/Plan   Problem List Items Addressed This Visit             ICD-10-CM    Osteoporosis - Primary M81.0    Relevant Orders    Comprehensive metabolic panel    Vitamin D 25-Hydroxy,Total (for eval of Vitamin D levels)    BMI 22.0-22.9, adult Z68.22       1. Osteoporosis- DEXA November 2022 shows osteoporosis with T score -2.8 in the left femoral neck and left total hip. She had an L2 compression fracture " December 2022. She previously fractured her left fibula in 2010, which required surgery. She has a history of metatarsal fractures in her left foot around age 50.   She resumed ibandronate 150 mg orally monthly 12/22. However, ibandronate does not have data showing that reduces risk of hip fractures, and her hip BMD is low.     She started Reclast 5 mg IV on June 6, 2023.  She tolerated the first infusion well.    2.  Lumbar spondylosis-she had midline L5-S1 epidural injection June 1, 2023 which helped .    3. ACP- has living will and HPOA (child Susi).    Plan:  We will order Reclast for June 2024.  Please premedicate yourself with tylenol 650 mg 30-60 minutes before infusion.  Please make dure you are well hydrated before infusion.  Check labs about 1 week before infusion: CMP, 25-hydroxy Vitamin D.  Continue vitamin D 1000 international units daily.  Try to get calcium 1200 mg total daily (diet plus supplements).  Continue weight bearing exercise.  Follow up in 1 year.

## 2024-04-13 RX ORDER — DIPHENHYDRAMINE HYDROCHLORIDE 50 MG/ML
50 INJECTION INTRAMUSCULAR; INTRAVENOUS AS NEEDED
Status: CANCELLED | OUTPATIENT
Start: 2024-06-06

## 2024-04-13 RX ORDER — ALBUTEROL SULFATE 0.83 MG/ML
3 SOLUTION RESPIRATORY (INHALATION) AS NEEDED
Status: CANCELLED | OUTPATIENT
Start: 2024-06-06

## 2024-04-13 RX ORDER — FAMOTIDINE 10 MG/ML
20 INJECTION INTRAVENOUS ONCE AS NEEDED
Status: CANCELLED | OUTPATIENT
Start: 2024-06-06

## 2024-04-13 RX ORDER — EPINEPHRINE 0.3 MG/.3ML
0.3 INJECTION SUBCUTANEOUS EVERY 5 MIN PRN
Status: CANCELLED | OUTPATIENT
Start: 2024-06-06

## 2024-04-13 RX ORDER — ZOLEDRONIC ACID 5 MG/100ML
5 INJECTION, SOLUTION INTRAVENOUS ONCE
Status: CANCELLED | OUTPATIENT
Start: 2024-06-07

## 2024-04-15 ENCOUNTER — OFFICE VISIT (OUTPATIENT)
Dept: PRIMARY CARE | Facility: CLINIC | Age: 79
End: 2024-04-15
Payer: MEDICARE

## 2024-04-15 VITALS
HEART RATE: 69 BPM | TEMPERATURE: 96.9 F | SYSTOLIC BLOOD PRESSURE: 134 MMHG | DIASTOLIC BLOOD PRESSURE: 81 MMHG | OXYGEN SATURATION: 97 % | WEIGHT: 117.6 LBS | BODY MASS INDEX: 22.22 KG/M2

## 2024-04-15 DIAGNOSIS — M81.0 AGE-RELATED OSTEOPOROSIS WITHOUT CURRENT PATHOLOGICAL FRACTURE: Primary | ICD-10-CM

## 2024-04-15 DIAGNOSIS — S32.020D COMPRESSION FRACTURE OF L2 VERTEBRA WITH ROUTINE HEALING, SUBSEQUENT ENCOUNTER: ICD-10-CM

## 2024-04-15 DIAGNOSIS — M80.00XD OSTEOPOROSIS WITH CURRENT PATHOLOGICAL FRACTURE WITH ROUTINE HEALING, UNSPECIFIED OSTEOPOROSIS TYPE, SUBSEQUENT ENCOUNTER: ICD-10-CM

## 2024-04-15 DIAGNOSIS — F41.9 ANXIETY: ICD-10-CM

## 2024-04-15 DIAGNOSIS — E78.5 HYPERLIPIDEMIA, UNSPECIFIED HYPERLIPIDEMIA TYPE: ICD-10-CM

## 2024-04-15 DIAGNOSIS — I10 PRIMARY HYPERTENSION: Primary | ICD-10-CM

## 2024-04-15 PROBLEM — L81.4 LENTIGINOSIS: Status: ACTIVE | Noted: 2024-04-15

## 2024-04-15 PROBLEM — D18.01 HEMANGIOMA OF SKIN: Status: ACTIVE | Noted: 2024-04-15

## 2024-04-15 PROCEDURE — 1158F ADVNC CARE PLAN TLK DOCD: CPT | Performed by: INTERNAL MEDICINE

## 2024-04-15 PROCEDURE — 1159F MED LIST DOCD IN RCRD: CPT | Performed by: INTERNAL MEDICINE

## 2024-04-15 PROCEDURE — 3079F DIAST BP 80-89 MM HG: CPT | Performed by: INTERNAL MEDICINE

## 2024-04-15 PROCEDURE — 99214 OFFICE O/P EST MOD 30 MIN: CPT | Performed by: INTERNAL MEDICINE

## 2024-04-15 PROCEDURE — 3075F SYST BP GE 130 - 139MM HG: CPT | Performed by: INTERNAL MEDICINE

## 2024-04-15 PROCEDURE — 1160F RVW MEDS BY RX/DR IN RCRD: CPT | Performed by: INTERNAL MEDICINE

## 2024-04-15 PROCEDURE — 1123F ACP DISCUSS/DSCN MKR DOCD: CPT | Performed by: INTERNAL MEDICINE

## 2024-04-15 PROCEDURE — 1157F ADVNC CARE PLAN IN RCRD: CPT | Performed by: INTERNAL MEDICINE

## 2024-04-15 RX ORDER — ZOLEDRONIC ACID 5 MG/100ML
INJECTION, SOLUTION INTRAVENOUS
Qty: 100 ML | Refills: 0 | OUTPATIENT
Start: 2024-04-15

## 2024-04-15 RX ORDER — LORAZEPAM 0.5 MG/1
0.5 TABLET ORAL EVERY 8 HOURS PRN
Qty: 10 TABLET | Refills: 0 | Status: SHIPPED | OUTPATIENT
Start: 2024-04-15 | End: 2024-04-20

## 2024-04-15 RX ORDER — LOSARTAN POTASSIUM 25 MG/1
25 TABLET ORAL DAILY
Qty: 30 TABLET | Refills: 11 | Status: SHIPPED | OUTPATIENT
Start: 2024-04-15 | End: 2025-04-15

## 2024-04-15 RX ORDER — HYDROXYZINE HYDROCHLORIDE 25 MG/1
25 TABLET, FILM COATED ORAL 2 TIMES DAILY PRN
Qty: 60 TABLET | Refills: 1 | Status: SHIPPED | OUTPATIENT
Start: 2024-04-15

## 2024-04-15 NOTE — PROGRESS NOTES
"Subjective   Patient ID: Zoraida Mejia is a 78 y.o. female who presents for 3 month follow up.    Here to follow up on medical problems HTN,HLD,Hypothyroid,anxiety,osteoporosis with vertebral Fx.  She c/o cough with Lisiopril,she cut it down to 1/2 tab,still with some residual cough.  She started Sertraline and not helped,wants to get off it,\"in past it did not help\". Sometimes she takes Hydroxyzine,but in past she responded to Ativan,wants a very small Rx.  she has ,HLD, depression,anxiety, hypothyroid,osteoporosis,tubular adenoma,GERD,h/o ovarian cyst,h/o kidney stone .taking her meds regularly,no side effects.She also has osteoporosis on IV Reclast and was covered,seen by Dr Higgins , she will will be due in June 6 202,ordered by Dr Higgins.  She also has her usual  LBP with radiation to both legs posterior aspect, but improved.  She had history of multilevel degenerative disc disease lumbar spine and L2 vertebral compression fracture few months ago,seen by N.S,ortho and pain management.she saw ortho,pain management,[facility],had 1 nerve block with some improvement but it came back and has an appt with Dr Masters.  No urinary symptoms or incontinence.no legs weakness.  She denies any depression,.           Review of Systems   Cardiovascular: Negative.    Psychiatric/Behavioral:          As HPI       Objective   /81 (BP Location: Left arm, Patient Position: Sitting, BP Cuff Size: Adult)   Pulse 69   Temp 36.1 °C (96.9 °F) (Temporal)   Wt 53.3 kg (117 lb 9.6 oz)   SpO2 97%   BMI 22.22 kg/m²     Physical Exam  Constitutional:       Appearance: Normal appearance.   HENT:      Head: Normocephalic and atraumatic.   Eyes:      Extraocular Movements: Extraocular movements intact.      Pupils: Pupils are equal, round, and reactive to light.   Cardiovascular:      Rate and Rhythm: Normal rate and regular rhythm.      Heart sounds: Normal heart sounds.   Pulmonary:      Effort: Pulmonary effort is normal.      Breath " sounds: Normal breath sounds. No wheezing or rhonchi.   Abdominal:      General: Abdomen is flat. Bowel sounds are normal. There is no distension.      Palpations: Abdomen is soft.   Musculoskeletal:         General: Normal range of motion.      Cervical back: Normal range of motion and neck supple.      Right lower leg: No edema.      Left lower leg: No edema.   Skin:     General: Skin is warm.   Neurological:      General: No focal deficit present.      Mental Status: She is alert and oriented to person, place, and time.   Psychiatric:         Mood and Affect: Mood normal.         Behavior: Behavior normal.         Assessment/Plan   Problem List Items Addressed This Visit             ICD-10-CM    Anxiety F41.9    Relevant Medications    LORazepam (Ativan) 0.5 mg tablet    Hyperlipidemia E78.5    Relevant Medications    hydrOXYzine HCL (Atarax) 25 mg tablet    Osteoporosis M81.0     On yearly IV Reclast, due in June 24.         Compression fracture of L2 lumbar vertebra (Multi) S32.020A    Primary hypertension - Primary I10     Pt has cough from Lisinopril,will switch to Losartan and follow up in 2 months.         Relevant Medications    losartan (Cozaar) 25 mg tablet

## 2024-04-16 ENCOUNTER — DOCUMENTATION (OUTPATIENT)
Dept: INFUSION THERAPY | Facility: CLINIC | Age: 79
End: 2024-04-16
Payer: MEDICARE

## 2024-04-16 NOTE — PROGRESS NOTES
Patient to be scheduled for  Continuation of Reclast infusions  For Diagnosis: Osteoporosis     Labs… (must be within 28 days of scheduled infusion)  Lab Results   Component Value Date    CREATININE 0.84 09/20/2023    There were no vitals filed for this visit.  CrCl: 41.7 (hold / contact prescribing provider if <35ml/min)     Calcium: 8.9 (Hold/ contact prescribing provider if <8.6 mg/dL)  Lab Results   Component Value Date    CALCIUM 8.9 09/20/2023    PHOS 3.3 12/27/2022      Lab Results   Component Value Date    ALBUMIN 4.1 01/04/2024          Calcium and Vitamin D supplement noted on medication list? NOT ON LIST BUT LAST PROVIDER'S NOTE PLAN: Continue vitamin D 1000 international units daily.  Try to get calcium 1200 mg total daily (diet plus supplements).  (if no nurse to encourage discussion of supplementation at visit)  Current Outpatient Medications on File Prior to Visit   Medication Sig Dispense Refill    bisacodyl (Dulcolax) 5 mg EC tablet Take 1 tablet (5 mg) by mouth once daily as needed.      hydrOXYzine HCL (Atarax) 25 mg tablet Take 1 tablet (25 mg) by mouth 2 times a day as needed for anxiety. 60 tablet 1    levothyroxine (Synthroid, Levoxyl) 25 mcg tablet Take 1 tablet (25 mcg) by mouth once daily. 90 tablet 3    LORazepam (Ativan) 0.5 mg tablet Take 1 tablet (0.5 mg) by mouth every 8 hours if needed for anxiety for up to 5 days. 10 tablet 0    losartan (Cozaar) 25 mg tablet Take 1 tablet (25 mg) by mouth once daily. 30 tablet 11    polyethylene glycol (Glycolax) 17 gram/dose powder Take by mouth once daily. MIX 1 CAPFUL (17GM) IN 8 OUNCES OF WATER, JUICE, OR TEA AND DRINK DAILY.      rosuvastatin (Crestor) 5 mg tablet Take 1 tablet (5 mg) by mouth once daily. 90 tablet 3    zoledronic acid (Reclast) 5 mg/100 mL piggyback Infuse 5mg IV once annually - due in June 2024 100 mL 0    [DISCONTINUED] hydrOXYzine HCL (Atarax) 25 mg tablet TAKE 1 TABLET BY MOUTH TWICE A DAY (Patient taking differently:  Take 1 tablet (25 mg) by mouth 2 times a day as needed.) 180 tablet 1    [DISCONTINUED] lisinopril 2.5 mg tablet Take 1 tablet (2.5 mg) by mouth once daily. 90 tablet 3    [DISCONTINUED] LORazepam (Ativan) 0.5 mg tablet Take 1 tablet (0.5 mg) by mouth every 8 hours if needed for anxiety for up to 5 days. 10 tablet 0     No current facility-administered medications on file prior to visit.        Is the patient receiving or have an allergy to Zometa? No  No Known Allergies     No obvious recent dental work per chart review. Nurse to confirm no dental within past/next 4 weeks at encounter.    Urine Hcg test ordered prior to first infusion? Not applicable (If female pt <60 years of age and with reproductive capability)    Last infusion received: 6/6/24 (if continuation)    Due: 6/6/25    This result meets treatment criteria. Ok to schedule for reclast - ALREADY APPROVED BY INSURANCE; please instruct pt to have labs drawn no more than 28 days prior to their scheduled appointment    [facility]  [facility]  [address] [place] [id number]  Phone: [phone]  Fax: [phone]  [email or web address]

## 2024-04-20 ASSESSMENT — ENCOUNTER SYMPTOMS: CARDIOVASCULAR NEGATIVE: 1

## 2024-05-08 ENCOUNTER — APPOINTMENT (OUTPATIENT)
Dept: ORTHOPEDIC SURGERY | Facility: CLINIC | Age: 79
End: 2024-05-08
Payer: MEDICARE

## 2024-05-09 ENCOUNTER — LAB (OUTPATIENT)
Dept: LAB | Facility: LAB | Age: 79
End: 2024-05-09
Payer: MEDICARE

## 2024-05-09 DIAGNOSIS — M81.0 AGE-RELATED OSTEOPOROSIS WITHOUT CURRENT PATHOLOGICAL FRACTURE: ICD-10-CM

## 2024-05-09 LAB
25(OH)D3 SERPL-MCNC: 25 NG/ML (ref 30–100)
ALBUMIN SERPL BCP-MCNC: 4.3 G/DL (ref 3.4–5)
ALP SERPL-CCNC: 83 U/L (ref 33–136)
ALT SERPL W P-5'-P-CCNC: 13 U/L (ref 7–45)
ANION GAP SERPL CALC-SCNC: 12 MMOL/L (ref 10–20)
AST SERPL W P-5'-P-CCNC: 18 U/L (ref 9–39)
BILIRUB SERPL-MCNC: 1 MG/DL (ref 0–1.2)
BUN SERPL-MCNC: 12 MG/DL (ref 6–23)
CALCIUM SERPL-MCNC: 9 MG/DL (ref 8.6–10.3)
CHLORIDE SERPL-SCNC: 102 MMOL/L (ref 98–107)
CO2 SERPL-SCNC: 27 MMOL/L (ref 21–32)
CREAT SERPL-MCNC: 0.88 MG/DL (ref 0.5–1.05)
EGFRCR SERPLBLD CKD-EPI 2021: 67 ML/MIN/1.73M*2
GLUCOSE SERPL-MCNC: 93 MG/DL (ref 74–99)
POTASSIUM SERPL-SCNC: 4 MMOL/L (ref 3.5–5.3)
PROT SERPL-MCNC: 7.1 G/DL (ref 6.4–8.2)
SODIUM SERPL-SCNC: 137 MMOL/L (ref 136–145)

## 2024-05-09 PROCEDURE — 82306 VITAMIN D 25 HYDROXY: CPT

## 2024-05-09 PROCEDURE — 80053 COMPREHEN METABOLIC PANEL: CPT

## 2024-05-09 PROCEDURE — 36415 COLL VENOUS BLD VENIPUNCTURE: CPT

## 2024-05-21 DIAGNOSIS — M54.16 LUMBAR RADICULAR PAIN: Primary | ICD-10-CM

## 2024-06-06 ENCOUNTER — APPOINTMENT (OUTPATIENT)
Dept: INFUSION THERAPY | Facility: CLINIC | Age: 79
End: 2024-06-06
Payer: MEDICARE

## 2024-06-07 ENCOUNTER — INFUSION (OUTPATIENT)
Dept: INFUSION THERAPY | Facility: CLINIC | Age: 79
End: 2024-06-07
Payer: MEDICARE

## 2024-06-07 VITALS
RESPIRATION RATE: 16 BRPM | HEART RATE: 76 BPM | DIASTOLIC BLOOD PRESSURE: 80 MMHG | TEMPERATURE: 97.4 F | OXYGEN SATURATION: 99 % | SYSTOLIC BLOOD PRESSURE: 126 MMHG

## 2024-06-07 DIAGNOSIS — M80.00XD OSTEOPOROSIS WITH CURRENT PATHOLOGICAL FRACTURE WITH ROUTINE HEALING, UNSPECIFIED OSTEOPOROSIS TYPE, SUBSEQUENT ENCOUNTER: ICD-10-CM

## 2024-06-07 PROCEDURE — 96365 THER/PROPH/DIAG IV INF INIT: CPT | Performed by: NURSE PRACTITIONER

## 2024-06-07 RX ORDER — ZOLEDRONIC ACID 5 MG/100ML
5 INJECTION, SOLUTION INTRAVENOUS ONCE
Status: CANCELLED | OUTPATIENT
Start: 2024-06-07

## 2024-06-07 RX ORDER — FAMOTIDINE 10 MG/ML
20 INJECTION INTRAVENOUS ONCE AS NEEDED
OUTPATIENT
Start: 2024-06-07

## 2024-06-07 RX ORDER — EPINEPHRINE 0.3 MG/.3ML
0.3 INJECTION SUBCUTANEOUS EVERY 5 MIN PRN
OUTPATIENT
Start: 2024-06-07

## 2024-06-07 RX ORDER — DIPHENHYDRAMINE HYDROCHLORIDE 50 MG/ML
50 INJECTION INTRAMUSCULAR; INTRAVENOUS AS NEEDED
OUTPATIENT
Start: 2024-06-07

## 2024-06-07 RX ORDER — ALBUTEROL SULFATE 0.83 MG/ML
3 SOLUTION RESPIRATORY (INHALATION) AS NEEDED
OUTPATIENT
Start: 2024-06-07

## 2024-06-07 RX ORDER — ZOLEDRONIC ACID 5 MG/100ML
5 INJECTION, SOLUTION INTRAVENOUS ONCE
Status: COMPLETED | OUTPATIENT
Start: 2024-06-07 | End: 2024-06-07

## 2024-06-07 RX ADMIN — ZOLEDRONIC ACID 5 MG: 5 INJECTION, SOLUTION INTRAVENOUS at 14:07

## 2024-06-07 NOTE — PROGRESS NOTES
[facility]   Infusion Clinic Note   Date: 2024   Name: Zoraida Mejia  : 1945   MRN: [id number]         Reason for Visit: OP Infusion (5 MG RECLAST iNFUSION)         Today: We administered zoledronic acid.       Visit Type: INFUSION             Accompanied by:Self      Diagnosis: Osteoporosis with current pathological fracture with routine healing, unspecified osteoporosis type, subsequent encounter       Allergies:   Allergies as of 2024    (No Known Allergies)         Current Medications has a current medication list which includes the following prescription(s): bisacodyl, hydroxyzine hcl, levothyroxine, lorazepam, losartan, polyethylene glycol, rosuvastatin, and zoledronic acid.       Vitals:   Vitals:    24 1347   BP: 126/80   Pulse: 76   Resp: 16   Temp: 36.3 °C (97.4 °F)   SpO2: 99%             Infusion Pre-procedure Checklist:   - Allergies reviewed: yes   - Medications reviewed: yes       - Previous reaction to current treatment: no      Assess patient for the concerns below. Document provider notification as appropriate.  - Active or recent infection with/without current antibiotic use: no  - Recent or planned invasive dental work: no  - Recent or planned surgeries: no  - Recently received or plans to receive vaccinations: no  - Has treatment related toxicities: no  - Is pregnant:  no      Pain: 0   - Is the pain different from normal: no   - Is the pain tolerable: n/a   - Is your Doctor aware:  n/a      Labs: N/A         Fall Risk Screening: Leighton Fall Risk  History of Falling, Immediate or Within 3 Months: No  Secondary Diagnosis: No  Ambulatory Aid: Walks without aid/bedrest/nurse assist  Intravenous Therapy/Heparin Lock: No  Gait/Transferring: Normal/bedrest/immobile  Mental Status: Oriented to own ability  [place] Fall Risk Score: 0       Review Of Systems:  Review of Systems - Oncology      Infusion Readiness:   - Assessment Concerns Related to  "Infusion: No  - Provider notified: n/a      Document Below Only If Indicated:   New Patient Education:    N/A (returning patient for continuation of therapy. Ongoing education provided as needed.)        Treatment Conditions & Drug Specific Questions:    Zoledronic Acid  (RECLAST)    (Unless otherwise specified on patient specific therapy plan):     TREATMENT CONDITIONS:  Unless otherwise specified on patient specific therapy plan HOLD and notify provider prior to proceeding with treatment if:   o Creatinine clearance LESS THAN 35 mL/Minute  o Corrected serum calcium LESS THAN 8.6 mg/dL   OR   Ionized calcium LESS THAN 1.1 mmol  o Recent (within 4 weeks) or planned invasive dental procedure.    Lab Results   Component Value Date    CREATININE 0.88 05/09/2024      Lab Results   Component Value Date    CALCIUM 9.0 05/09/2024    PHOS 3.3 12/27/2022      No results found for: \"CAION\"    CrCl: 39.1  Corrected calcium: 9.0    Labs reviewed and patient meets treatment conditions? Yes    DRUG SPECIFIC QUESTIONS:  Is the patient taking a Calcium and Vitamin D supplement?  No  (Recommended)    Is the patient receiving Zometa or do they have an allergy to Zometa?  No    Is the patient aware of the adverse effects which may include bone fractures, hypocalcemia, influenza-like illness, musculoskeletal pain, ocular inflammation, and osteonecrosis of the jaw? Yes      REMINDERS:  PREGNANCY CATEGORY X DRUG. OBTAIN NEGATIVE PREGNANCY TEST PRIOR TO FIRST INFUSION FOR WOMEN OF CHILDBEARING ABILITY     Recommended Vitals/Observation:  Monitor vital signs before infusion, at the end of the infusion and as needed        Weight Based Drug Calculations:    WEIGHT BASED DRUGS: NOT APPLICABLE / FLAT DOSE          Initiated By: Danae Benz RN        "

## 2024-06-07 NOTE — PATIENT INSTRUCTIONS
Today :We administered zoledronic acid.     For:   1. Osteoporosis with current pathological fracture with routine healing, unspecified osteoporosis type, subsequent encounter         Your next appointment is due in:  1 YEAR        Please read the  Medication Guide that was given to you and reviewed during todays visit.     (Tell all doctors including dentists that you are taking this medication)     Go to the emergency room or call 911 if:  -You have signs of allergic reaction:   -Rash, hives, itching.   -Swollen, blistered, peeling skin.   -Swelling of face, lips, mouth, tongue or throat.   -Tightness of chest, trouble breathing, swallowing or talking     Call your doctor:  - If IV / injection site gets red, warm, swollen, itchy or leaks fluid or pus.     (Leave dressing on your IV site for at least 2 hours and keep area clean and dry  - If you get sick or have symptoms of infection or are not feeling well for any reason.    (Wash your hands often, stay away from people who are sick)  - If you have side effects from your medication that do not go away or are bothersome.     (Refer to the teaching your nurse gave you for side effects to call your doctor about)    - Common side effects may include:  stuffy nose, headache, feeling tired, muscle aches, upset stomach  - Before receiving any vaccines     - Call the [facility] at   If:  - You get sick, are on antibiotics, have had a recent vaccine, have surgery or dental work and your doctor wants your visit rescheduled.  - You need to cancel and reschedule your visit for any reason. Call at least 2 days before your visit if you need to cancel.   - Your insurance changes before your next visit.    (We will need to get approval from your new insurance. This can take up to two weeks.)     The [facility] is opened Monday thru Friday. We are closed on weekends and holidays.   Voice mail will take your call if the center is closed. If you  leave a message please allow 24 hours for a call back during weekdays. If you leave a message on a weekend/holiday, we will call you back the next business day.

## 2024-06-20 ENCOUNTER — HOSPITAL ENCOUNTER (OUTPATIENT)
Dept: PAIN MEDICINE | Facility: CLINIC | Age: 79
Discharge: HOME | End: 2024-06-20
Payer: MEDICARE

## 2024-06-20 ENCOUNTER — HOSPITAL ENCOUNTER (OUTPATIENT)
Dept: RADIOLOGY | Facility: HOSPITAL | Age: 79
Discharge: HOME | End: 2024-06-20
Payer: MEDICARE

## 2024-06-20 VITALS
HEART RATE: 69 BPM | SYSTOLIC BLOOD PRESSURE: 128 MMHG | TEMPERATURE: 96.7 F | OXYGEN SATURATION: 100 % | DIASTOLIC BLOOD PRESSURE: 63 MMHG | RESPIRATION RATE: 18 BRPM

## 2024-06-20 DIAGNOSIS — M54.16 LUMBAR RADICULAR PAIN: ICD-10-CM

## 2024-06-20 PROCEDURE — 62323 NJX INTERLAMINAR LMBR/SAC: CPT | Performed by: PAIN MEDICINE

## 2024-06-20 PROCEDURE — 2500000004 HC RX 250 GENERAL PHARMACY W/ HCPCS (ALT 636 FOR OP/ED): Performed by: PAIN MEDICINE

## 2024-06-20 PROCEDURE — 2550000001 HC RX 255 CONTRASTS: Performed by: PAIN MEDICINE

## 2024-06-20 RX ORDER — BUPIVACAINE HYDROCHLORIDE 2.5 MG/ML
5 INJECTION, SOLUTION EPIDURAL; INFILTRATION; INTRACAUDAL ONCE
Status: DISCONTINUED | OUTPATIENT
Start: 2024-06-20 | End: 2024-06-21 | Stop reason: HOSPADM

## 2024-06-20 RX ORDER — METHYLPREDNISOLONE ACETATE 40 MG/ML
80 INJECTION, SUSPENSION INTRA-ARTICULAR; INTRALESIONAL; INTRAMUSCULAR; SOFT TISSUE ONCE
Status: DISCONTINUED | OUTPATIENT
Start: 2024-06-20 | End: 2024-06-21 | Stop reason: HOSPADM

## 2024-06-20 RX ORDER — BUPIVACAINE HYDROCHLORIDE 2.5 MG/ML
INJECTION, SOLUTION EPIDURAL; INFILTRATION; INTRACAUDAL AS NEEDED
Status: DISCONTINUED | OUTPATIENT
Start: 2024-06-20 | End: 2024-06-21 | Stop reason: HOSPADM

## 2024-06-20 RX ORDER — METHYLPREDNISOLONE ACETATE 40 MG/ML
INJECTION, SUSPENSION INTRA-ARTICULAR; INTRALESIONAL; INTRAMUSCULAR; SOFT TISSUE AS NEEDED
Status: DISCONTINUED | OUTPATIENT
Start: 2024-06-20 | End: 2024-06-21 | Stop reason: HOSPADM

## 2024-06-20 ASSESSMENT — ENCOUNTER SYMPTOMS
LOSS OF SENSATION IN FEET: 0
DEPRESSION: 0
OCCASIONAL FEELINGS OF UNSTEADINESS: 0

## 2024-06-20 ASSESSMENT — PAIN DESCRIPTION - DESCRIPTORS: DESCRIPTORS: ACHING

## 2024-06-20 ASSESSMENT — PAIN - FUNCTIONAL ASSESSMENT: PAIN_FUNCTIONAL_ASSESSMENT: 0-10

## 2024-06-20 ASSESSMENT — PAIN SCALES - GENERAL: PAINLEVEL_OUTOF10: 4

## 2024-06-20 NOTE — DISCHARGE INSTRUCTIONS
Post-injection instructions:    Your pain may not be gone immediately after the procedure--it usually takes the steroid 3-5 days to start working.   It may take several weeks for the medicine to reach its' full effect.   Pay attention to how much pain relief (what percentage compared to before the procedure) you get and for how long it lasts.     Activity: Avoid strenuous activity for 24 hours. After that return to your normal activity level.     Bandages: Remove after 24 hours     Showering/Bathing: You may shower after bandage is removed     Follow up: CALL OFFICE IN 7 DAYS [phone] LEAVE MESSAGE ABOUT THE RELIEF THAT WAS OBTAINED      Call the doctor immediately: if you notice:     Excessive bleeding from procedure site (brisk bright red bleeding from the site or bleeding that soaks the bandages or does not stop)   Severe headache  Inability to walk, leg or arm weakness or numbness that is worse after the procedure   Uncontrolled pain   New urinary or fecal incontinence   Signs of infection: Fever above 101.5F, redness, swelling, pus or drainage from the site    Epidural Injection    Why is this procedure done?  With an epidural injection, the doctor injects drugs deep into the area around your spinal cord. This is different than epidural anesthesia that is used for surgery or when a woman has a baby. Your spine is a group of bones in your back that protect the nerves in your spinal cord. Problems with your spine can cause swollen nerves in the spinal cord. This swelling leads to pain and can limit movement. In an epidural injection, the doctor may give you a drug to help with swelling and pain.  You may have an epidural injection in different parts of your back, based on where your pain is. For pain in your head or arms, you may have a cervical epidural injection. If your pain is in your upper or middle back, you may get a thoracic epidural injection. For pain in your lower back or legs, you may get a  lumbar epidural injection.    What will the results be?  The treatment may:  Lower pain  Reduce swelling in the nerves  Improve movement  What happens before the procedure?  Your doctor will take your history. Talk to the doctor about:  All the drugs you are taking. Be sure to include all prescription and over-the-counter (OTC) drugs, and herbal supplements. Tell the doctor about any drug allergy. Bring a list of drugs you take with you.  If you have high blood sugar or diabetes. Your drugs may need to be changed.  Any bleeding problems. Be sure to tell your doctor if you are taking any drugs that may cause bleeding. Some of these are warfarin, rivaroxaban, apixaban, ticagrelor, clopidogrel, ketorolac, ibuprofen, naproxen, or aspirin. Certain vitamins and herbs, such as garlic and fish oil, may also add to the risk for bleeding. You may need to stop these drugs as well. Talk to your doctor about them.  Tell the doctor if you are pregnant.  You will not be allowed to drive right away after the procedure. Ask a family member or a friend to drive you home.  What happens during the procedure?  To help the doctor make sure the drugs are being injected in the right place, your doctor may do an x-ray of your spine. Other times your doctor may do a continuous x-ray during the procedure. This is a fluoroscopy. The doctor may also use a colored dye or a contrast dye to check where to inject the drug.  You may be given a drug to help you relax. You may be given a drug to make the area of the injection numb.  The doctor will clean the skin on your back or neck. This helps prevent infection.  The doctor will put a needle through the skin toward your spine. The drug will be injected into a space near the spine.  The needle will be taken out and a bandage will be placed over the injection site.  What happens after the procedure?  Staff will check on you to make sure you are doing well. They will tell you when you can go  home.  What care is needed at home?  Relax on the day of the injection.  Do not drive or run machines for at least 12 hours afterwards.  Apply ice to the injection site. Place an ice pack or a bag of frozen peas wrapped in a towel over the painful part. Never put ice right on the skin. Do not leave the ice on more than 10 to 15 minutes at a time.  It may take a few days before you will feel the effects of the injection.  What follow-up care is needed?  Your doctor may ask you to make visits to the office to check on your progress. Be sure to keep these visits. You may also need to see a physical therapist (PT). The PT will teach you exercises to help you get back your strength and motion. Ask your doctor when you can exercise.  What problems could happen?  Bleeding  Infection (rare)  Headache  Nerve injury  If you have diabetes, your blood sugar can go up after the injection. Check with your doctor if you need more treatment for this.  Last Reviewed Date

## 2024-07-02 ENCOUNTER — TELEPHONE (OUTPATIENT)
Dept: PRIMARY CARE | Facility: CLINIC | Age: 79
End: 2024-07-02
Payer: MEDICARE

## 2024-07-02 NOTE — TELEPHONE ENCOUNTER
Pt called asking if she needs to do blood work prior to appt on 7/9/24?    Please call her and let her know so she can get it done prior to appt.    Please call pt at  [phone]

## 2024-07-09 ENCOUNTER — APPOINTMENT (OUTPATIENT)
Dept: PRIMARY CARE | Facility: CLINIC | Age: 79
End: 2024-07-09
Payer: MEDICARE

## 2024-07-09 VITALS
DIASTOLIC BLOOD PRESSURE: 70 MMHG | OXYGEN SATURATION: 96 % | TEMPERATURE: 96.7 F | SYSTOLIC BLOOD PRESSURE: 110 MMHG | WEIGHT: 116 LBS | BODY MASS INDEX: 21.92 KG/M2 | HEART RATE: 68 BPM

## 2024-07-09 DIAGNOSIS — I10 PRIMARY HYPERTENSION: Primary | ICD-10-CM

## 2024-07-09 DIAGNOSIS — R42 DIZZINESS: ICD-10-CM

## 2024-07-09 DIAGNOSIS — J02.9 SORE THROAT: ICD-10-CM

## 2024-07-09 DIAGNOSIS — R11.0 NAUSEA: ICD-10-CM

## 2024-07-09 DIAGNOSIS — F41.9 ANXIETY: ICD-10-CM

## 2024-07-09 DIAGNOSIS — H81.10 BENIGN PAROXYSMAL POSITIONAL VERTIGO, UNSPECIFIED LATERALITY: ICD-10-CM

## 2024-07-09 DIAGNOSIS — K21.9 GERD WITHOUT ESOPHAGITIS: ICD-10-CM

## 2024-07-09 DIAGNOSIS — E78.49 OTHER HYPERLIPIDEMIA: ICD-10-CM

## 2024-07-09 DIAGNOSIS — E03.9 ACQUIRED HYPOTHYROIDISM: ICD-10-CM

## 2024-07-09 LAB — POC RAPID STREP: NEGATIVE

## 2024-07-09 PROCEDURE — 1123F ACP DISCUSS/DSCN MKR DOCD: CPT | Performed by: INTERNAL MEDICINE

## 2024-07-09 PROCEDURE — 1157F ADVNC CARE PLAN IN RCRD: CPT | Performed by: INTERNAL MEDICINE

## 2024-07-09 PROCEDURE — 1160F RVW MEDS BY RX/DR IN RCRD: CPT | Performed by: INTERNAL MEDICINE

## 2024-07-09 PROCEDURE — 99214 OFFICE O/P EST MOD 30 MIN: CPT | Performed by: INTERNAL MEDICINE

## 2024-07-09 PROCEDURE — 93000 ELECTROCARDIOGRAM COMPLETE: CPT | Performed by: INTERNAL MEDICINE

## 2024-07-09 PROCEDURE — 3074F SYST BP LT 130 MM HG: CPT | Performed by: INTERNAL MEDICINE

## 2024-07-09 PROCEDURE — 87880 STREP A ASSAY W/OPTIC: CPT | Performed by: INTERNAL MEDICINE

## 2024-07-09 PROCEDURE — 1036F TOBACCO NON-USER: CPT | Performed by: INTERNAL MEDICINE

## 2024-07-09 PROCEDURE — 3078F DIAST BP <80 MM HG: CPT | Performed by: INTERNAL MEDICINE

## 2024-07-09 PROCEDURE — 1159F MED LIST DOCD IN RCRD: CPT | Performed by: INTERNAL MEDICINE

## 2024-07-09 RX ORDER — ONDANSETRON 4 MG/1
4 TABLET, FILM COATED ORAL EVERY 8 HOURS PRN
Qty: 20 TABLET | Refills: 0 | Status: SHIPPED | OUTPATIENT
Start: 2024-07-09 | End: 2024-07-16

## 2024-07-09 RX ORDER — MECLIZINE HCL 12.5 MG 12.5 MG/1
12.5 TABLET ORAL 3 TIMES DAILY PRN
Qty: 30 TABLET | Refills: 0 | Status: SHIPPED | OUTPATIENT
Start: 2024-07-09 | End: 2025-07-09

## 2024-07-09 ASSESSMENT — ENCOUNTER SYMPTOMS
PSYCHIATRIC NEGATIVE: 1
EYES NEGATIVE: 1
DIZZINESS: 1
RESPIRATORY NEGATIVE: 1
HEMATOLOGIC/LYMPHATIC NEGATIVE: 1
CARDIOVASCULAR NEGATIVE: 1
CONSTITUTIONAL NEGATIVE: 1
GASTROINTESTINAL NEGATIVE: 1

## 2024-07-09 NOTE — PROGRESS NOTES
Subjective   Patient ID: Zoraida Mejia is a 79 y.o. female who presents for Follow-up (Patient is here for a 2 month follow up. ) and Dizziness (Patient complains of dizziness, nausea, and throat dryness for the past couple of weeks. ).    Here to follow up on medical problems HTN,HLD,Hypothyroid,anxiety,osteoporosis with vertebral Fx.  She also c/o of dizziness(room spinning around her) especially in AM,better if she sits still,associated with nausea,started on 7/4,no slurred speech,numbness,weakness,palpitation,CP.  She also has a sore throat since 7/4,no ear pain or sinus congestion.she has been drinking more water and that caused more dizziness and nausea.  Her cough resolved after we stopped Lisinopril,she is now on Losartan,has not been checking her home BP.  She stopped taking Sertraline and not helped. Sometimes she takes Hydroxyzine,but in past she responded to Ativan,has a very small Rx.  she has ,HLD, depression,anxiety, hypothyroid,osteoporosis,tubular adenoma,GERD,h/o ovarian cyst,h/o kidney stone .taking her meds regularly,no side effects.She also has osteoporosis on IV Reclast and was covered,seen by Dr Higgins , ordered by Dr Higgins.  She also has her usual  LBP with radiation to both legs posterior aspect, but improved.she had nerve block 2 weeks ago.  She had history of multilevel degenerative disc disease lumbar spine and L2 vertebral compression fracture few months ago,seen by N.S,ortho and pain management.she saw ortho,pain management,,had 1 nerve block with some improvement but it came back and has an appt with Dr Masters.  No urinary symptoms or incontinence.no legs weakness.  She denies any depression,.           Review of Systems   Constitutional: Negative.    HENT: Negative.          Has bilateral hearing aids.   Eyes: Negative.    Respiratory: Negative.     Cardiovascular: Negative.    Gastrointestinal: Negative.         As HPI,nausea when she has the dizziness.   Genitourinary:  Negative.    Musculoskeletal:         As HPI.   Neurological:  Positive for dizziness.        As HPI.   Hematological: Negative.    Psychiatric/Behavioral: Negative.         Objective   /70 (BP Location: Left arm, Patient Position: Standing)   Pulse 68   Temp 35.9 °C (96.7 °F) (Temporal)   Wt 52.6 kg (116 lb)   SpO2 96%   BMI 21.92 kg/m²     Physical Exam  Vitals reviewed.   Constitutional:       Appearance: Normal appearance.   HENT:      Head: Normocephalic and atraumatic.      Right Ear: Tympanic membrane and ear canal normal.      Left Ear: Tympanic membrane and ear canal normal.      Nose: Nose normal.      Mouth/Throat:      Pharynx: Posterior oropharyngeal erythema present. No oropharyngeal exudate.   Eyes:      Extraocular Movements: Extraocular movements intact.      Pupils: Pupils are equal, round, and reactive to light.   Cardiovascular:      Rate and Rhythm: Normal rate and regular rhythm.      Pulses: Normal pulses.      Heart sounds: Normal heart sounds. No murmur heard.  Pulmonary:      Effort: Pulmonary effort is normal.      Breath sounds: Normal breath sounds.   Abdominal:      General: Abdomen is flat. Bowel sounds are normal.      Palpations: Abdomen is soft.   Musculoskeletal:         General: Normal range of motion.      Cervical back: Normal range of motion and neck supple.   Lymphadenopathy:      Cervical: No cervical adenopathy.   Neurological:      General: No focal deficit present.      Mental Status: She is alert and oriented to person, place, and time.      Cranial Nerves: No cranial nerve deficit.      Sensory: No sensory deficit.      Motor: No weakness.      Coordination: Coordination normal.      Gait: Gait normal.      Deep Tendon Reflexes: Reflexes normal.      Comments: Finger to nose and alternative movement intact.  Moving head reproduce dizziness.   Psychiatric:         Mood and Affect: Mood normal.         Assessment/Plan   Problem List Items Addressed This Visit              ICD-10-CM    Anxiety F41.9     Did not respond to SSRI.  Stay off Sertraline.  Continue continue hydroxyzine as needed.           Dizziness R42     Suspect BPV.  EKG done today.  Do exercise as per provided handout.  Consider vestibular therapy.         Relevant Orders    ECG 12 lead (Clinic Performed)    GERD without esophagitis K21.9    Hyperlipidemia E78.5     Stable on Crestor.         Hypothyroidism E03.9     Stable on levothyroxine.         Nausea R11.0    Relevant Medications    ondansetron (Zofran) 4 mg tablet    Sore throat J02.9    Relevant Orders    POCT Rapid Strep A manually resulted (Completed)    Primary hypertension - Primary I10     Stable on Losartan ,but can cut it in 1/2 and take at night,monitor BP and dizziness.         Benign paroxysmal positional vertigo H81.10    Relevant Medications    meclizine (Antivert) 12.5 mg tablet

## 2024-08-20 ENCOUNTER — APPOINTMENT (OUTPATIENT)
Dept: PRIMARY CARE | Facility: CLINIC | Age: 79
End: 2024-08-20
Payer: MEDICARE

## 2024-08-20 VITALS
BODY MASS INDEX: 22.14 KG/M2 | WEIGHT: 117.2 LBS | OXYGEN SATURATION: 97 % | DIASTOLIC BLOOD PRESSURE: 76 MMHG | HEART RATE: 73 BPM | SYSTOLIC BLOOD PRESSURE: 111 MMHG | TEMPERATURE: 96.6 F

## 2024-08-20 DIAGNOSIS — I10 PRIMARY HYPERTENSION: Primary | ICD-10-CM

## 2024-08-20 DIAGNOSIS — F41.9 ANXIETY: ICD-10-CM

## 2024-08-20 DIAGNOSIS — E03.9 ACQUIRED HYPOTHYROIDISM: ICD-10-CM

## 2024-08-20 DIAGNOSIS — N83.299 OTHER OVARIAN CYST, UNSPECIFIED SIDE: ICD-10-CM

## 2024-08-20 DIAGNOSIS — M80.00XD OSTEOPOROSIS WITH CURRENT PATHOLOGICAL FRACTURE WITH ROUTINE HEALING, UNSPECIFIED OSTEOPOROSIS TYPE, SUBSEQUENT ENCOUNTER: ICD-10-CM

## 2024-08-20 DIAGNOSIS — E78.49 OTHER HYPERLIPIDEMIA: ICD-10-CM

## 2024-08-20 PROBLEM — R10.9 RIGHT FLANK PAIN: Status: RESOLVED | Noted: 2023-02-16 | Resolved: 2024-08-20

## 2024-08-20 PROBLEM — R10.9 RT FLANK PAIN: Status: RESOLVED | Noted: 2023-07-19 | Resolved: 2024-08-20

## 2024-08-20 PROBLEM — M25.551 RIGHT HIP PAIN: Status: RESOLVED | Noted: 2023-07-19 | Resolved: 2024-08-20

## 2024-08-20 PROBLEM — J02.9 SORE THROAT: Status: RESOLVED | Noted: 2023-02-16 | Resolved: 2024-08-20

## 2024-08-20 PROCEDURE — 3078F DIAST BP <80 MM HG: CPT | Performed by: INTERNAL MEDICINE

## 2024-08-20 PROCEDURE — 99214 OFFICE O/P EST MOD 30 MIN: CPT | Performed by: INTERNAL MEDICINE

## 2024-08-20 PROCEDURE — 1036F TOBACCO NON-USER: CPT | Performed by: INTERNAL MEDICINE

## 2024-08-20 PROCEDURE — 3074F SYST BP LT 130 MM HG: CPT | Performed by: INTERNAL MEDICINE

## 2024-08-20 PROCEDURE — 1123F ACP DISCUSS/DSCN MKR DOCD: CPT | Performed by: INTERNAL MEDICINE

## 2024-08-20 PROCEDURE — 1157F ADVNC CARE PLAN IN RCRD: CPT | Performed by: INTERNAL MEDICINE

## 2024-08-20 PROCEDURE — 1159F MED LIST DOCD IN RCRD: CPT | Performed by: INTERNAL MEDICINE

## 2024-08-20 PROCEDURE — 1160F RVW MEDS BY RX/DR IN RCRD: CPT | Performed by: INTERNAL MEDICINE

## 2024-08-20 RX ORDER — BUSPIRONE HYDROCHLORIDE 5 MG/1
5 TABLET ORAL 2 TIMES DAILY
Qty: 60 TABLET | Refills: 2 | Status: SHIPPED | OUTPATIENT
Start: 2024-08-20 | End: 2025-08-20

## 2024-08-20 ASSESSMENT — ENCOUNTER SYMPTOMS
CARDIOVASCULAR NEGATIVE: 1
NERVOUS/ANXIOUS: 1
CONSTITUTIONAL NEGATIVE: 1
NEUROLOGICAL NEGATIVE: 1
GASTROINTESTINAL NEGATIVE: 1
RESPIRATORY NEGATIVE: 1
EYES NEGATIVE: 1
HEMATOLOGIC/LYMPHATIC NEGATIVE: 1

## 2024-08-20 NOTE — PROGRESS NOTES
"Subjective   Patient ID: Zoraida Mejia is a 79 y.o. female who presents for Follow-up (Patient is here for a 6 week follow up on blood pressure. ) and Anxiety (Patient also has been a \"nervous wreck\" lately and would like to discuss an anxiety medication. Patient has been having family issues which is the cause of high anxiety. ).    Here to follow up on medical problems HTN,HLD,Hypothyroid,tubular adenoma,GERD,h/o ovarian cyst,h/o kidney stone ,anxiety,osteoporosis with vertebral Fx.  She still has anxiety on a daily basis.  Her cough resolved after stopping Lisinopril,currently on Losartan, home BP at goal.  In past ,she stopped taking Sertraline and not helped. Sometimes she takes Hydroxyzine,but in past she responded to Ativan,has a very small Rx.  she has ,HLD, depression,anxiety, hypothyroid,osteoporosis,.taking her meds regularly,no side effects.She also has osteoporosis on IV Reclast and was covered,seen by Dr Higgins , ordered by Dr Higgins.  She had history of multilevel degenerative disc disease lumbar spine and L2 vertebral compression fracture few months ago,seen by [facility],ortho and pain management.she saw ortho,pain management,[facility],had 1 nerve block with some improvement but it came back and has an appt with Dr Masters.  No urinary symptoms or incontinence.no legs weakness.  She denies any depression,.           Review of Systems   Constitutional: Negative.    HENT: Negative.     Eyes: Negative.    Respiratory: Negative.     Cardiovascular: Negative.    Gastrointestinal: Negative.    Genitourinary: Negative.    Musculoskeletal:         As HPI.   Neurological: Negative.    Hematological: Negative.    Psychiatric/Behavioral:  The patient is nervous/anxious.        Objective   /76 (BP Location: Left arm, Patient Position: Sitting, BP Cuff Size: Adult)   Pulse 73   Temp 35.9 °C (96.6 °F) (Temporal)   Wt 53.2 kg (117 lb 3.2 oz)   SpO2 97%   BMI 22.14 kg/m²     Physical Exam  Constitutional:       " Appearance: Normal appearance.   HENT:      Head: Normocephalic and atraumatic.   Eyes:      Extraocular Movements: Extraocular movements intact.      Pupils: Pupils are equal, round, and reactive to light.   Cardiovascular:      Rate and Rhythm: Normal rate and regular rhythm.      Heart sounds: Normal heart sounds.   Pulmonary:      Effort: Pulmonary effort is normal.      Breath sounds: Normal breath sounds. No wheezing or rhonchi.   Abdominal:      General: Abdomen is flat.   Musculoskeletal:         General: Normal range of motion.      Cervical back: Normal range of motion and neck supple.      Right lower leg: No edema.      Left lower leg: No edema.   Skin:     General: Skin is warm.   Neurological:      General: No focal deficit present.      Mental Status: She is alert and oriented to person, place, and time.   Psychiatric:         Mood and Affect: Mood normal.         Behavior: Behavior normal.      Comments: Anxious in no acute distress,         Assessment/Plan   Problem List Items Addressed This Visit             ICD-10-CM    Anxiety F41.9     Will start buspirone 5 mg twice daily.  Follow-up in 6 weeks.         Relevant Medications    busPIRone (Buspar) 5 mg tablet    Hyperlipidemia E78.5     Stable on Crestor.         Hypothyroidism E03.9     Stable on levothyroxine.         Osteoporosis M81.0     On yearly IV Reclast, last done June 2024          Other ovarian cyst, unspecified side N83.299     Stable         Primary hypertension - Primary I10     Stable on Losartan .

## 2024-09-12 DIAGNOSIS — F41.9 ANXIETY: ICD-10-CM

## 2024-09-12 RX ORDER — BUSPIRONE HYDROCHLORIDE 5 MG/1
5 TABLET ORAL 2 TIMES DAILY
Qty: 180 TABLET | Refills: 1 | Status: SHIPPED | OUTPATIENT
Start: 2024-09-12

## 2024-09-17 ENCOUNTER — APPOINTMENT (OUTPATIENT)
Dept: DERMATOLOGY | Facility: CLINIC | Age: 79
End: 2024-09-17
Payer: MEDICARE

## 2024-10-01 ENCOUNTER — LAB (OUTPATIENT)
Dept: LAB | Facility: LAB | Age: 79
End: 2024-10-01
Payer: MEDICARE

## 2024-10-01 ENCOUNTER — APPOINTMENT (OUTPATIENT)
Dept: PRIMARY CARE | Facility: CLINIC | Age: 79
End: 2024-10-01
Payer: MEDICARE

## 2024-10-01 ENCOUNTER — OFFICE VISIT (OUTPATIENT)
Dept: DERMATOLOGY | Facility: CLINIC | Age: 79
End: 2024-10-01
Payer: MEDICARE

## 2024-10-01 VITALS
WEIGHT: 121.2 LBS | OXYGEN SATURATION: 96 % | HEIGHT: 62 IN | SYSTOLIC BLOOD PRESSURE: 125 MMHG | BODY MASS INDEX: 22.31 KG/M2 | HEART RATE: 72 BPM | DIASTOLIC BLOOD PRESSURE: 79 MMHG | TEMPERATURE: 96.5 F

## 2024-10-01 DIAGNOSIS — Z79.899 HIGH RISK MEDICATION USE: ICD-10-CM

## 2024-10-01 DIAGNOSIS — Z00.00 ROUTINE GENERAL MEDICAL EXAMINATION AT HEALTH CARE FACILITY: ICD-10-CM

## 2024-10-01 DIAGNOSIS — Z41.9 ELECTIVE SURGERY FOR PURPOSES OTHER THAN TREATING HEALTH CONDITIONS: Primary | ICD-10-CM

## 2024-10-01 DIAGNOSIS — I10 PRIMARY HYPERTENSION: ICD-10-CM

## 2024-10-01 DIAGNOSIS — F41.9 ANXIETY: ICD-10-CM

## 2024-10-01 DIAGNOSIS — K59.00 CONSTIPATION, UNSPECIFIED CONSTIPATION TYPE: ICD-10-CM

## 2024-10-01 DIAGNOSIS — Z78.0 ASYMPTOMATIC MENOPAUSE: ICD-10-CM

## 2024-10-01 DIAGNOSIS — Z00.00 MEDICARE ANNUAL WELLNESS VISIT, SUBSEQUENT: Primary | ICD-10-CM

## 2024-10-01 DIAGNOSIS — E78.5 HYPERLIPIDEMIA, UNSPECIFIED HYPERLIPIDEMIA TYPE: ICD-10-CM

## 2024-10-01 DIAGNOSIS — M80.00XD OSTEOPOROSIS WITH CURRENT PATHOLOGICAL FRACTURE WITH ROUTINE HEALING, UNSPECIFIED OSTEOPOROSIS TYPE, SUBSEQUENT ENCOUNTER: ICD-10-CM

## 2024-10-01 DIAGNOSIS — E03.9 ACQUIRED HYPOTHYROIDISM: ICD-10-CM

## 2024-10-01 DIAGNOSIS — L98.8 WRINKLES: ICD-10-CM

## 2024-10-01 LAB
AMPHETAMINES UR QL SCN: NORMAL
BARBITURATES UR QL SCN: NORMAL
BENZODIAZ UR QL SCN: NORMAL
BZE UR QL SCN: NORMAL
CANNABINOIDS UR QL SCN: NORMAL
FENTANYL+NORFENTANYL UR QL SCN: NORMAL
METHADONE UR QL SCN: NORMAL
OPIATES UR QL SCN: NORMAL
OXYCODONE+OXYMORPHONE UR QL SCN: NORMAL
PCP UR QL SCN: NORMAL

## 2024-10-01 PROCEDURE — 3074F SYST BP LT 130 MM HG: CPT | Performed by: INTERNAL MEDICINE

## 2024-10-01 PROCEDURE — 1157F ADVNC CARE PLAN IN RCRD: CPT | Performed by: DERMATOLOGY

## 2024-10-01 PROCEDURE — 1159F MED LIST DOCD IN RCRD: CPT | Performed by: INTERNAL MEDICINE

## 2024-10-01 PROCEDURE — 1123F ACP DISCUSS/DSCN MKR DOCD: CPT | Performed by: INTERNAL MEDICINE

## 2024-10-01 PROCEDURE — G0439 PPPS, SUBSEQ VISIT: HCPCS | Performed by: INTERNAL MEDICINE

## 2024-10-01 PROCEDURE — 1170F FXNL STATUS ASSESSED: CPT | Performed by: INTERNAL MEDICINE

## 2024-10-01 PROCEDURE — PBTXA ADMINISTRATION FEE COSMETIC: Performed by: DERMATOLOGY

## 2024-10-01 PROCEDURE — 3078F DIAST BP <80 MM HG: CPT | Performed by: INTERNAL MEDICINE

## 2024-10-01 PROCEDURE — 1036F TOBACCO NON-USER: CPT | Performed by: INTERNAL MEDICINE

## 2024-10-01 PROCEDURE — 80307 DRUG TEST PRSMV CHEM ANLYZR: CPT

## 2024-10-01 PROCEDURE — BOTOX BOTOX 1 UNIT: Performed by: DERMATOLOGY

## 2024-10-01 PROCEDURE — 1158F ADVNC CARE PLAN TLK DOCD: CPT | Performed by: INTERNAL MEDICINE

## 2024-10-01 PROCEDURE — 1123F ACP DISCUSS/DSCN MKR DOCD: CPT | Performed by: DERMATOLOGY

## 2024-10-01 PROCEDURE — 1157F ADVNC CARE PLAN IN RCRD: CPT | Performed by: INTERNAL MEDICINE

## 2024-10-01 PROCEDURE — 1160F RVW MEDS BY RX/DR IN RCRD: CPT | Performed by: INTERNAL MEDICINE

## 2024-10-01 PROCEDURE — 1036F TOBACCO NON-USER: CPT | Performed by: DERMATOLOGY

## 2024-10-01 PROCEDURE — 1159F MED LIST DOCD IN RCRD: CPT | Performed by: DERMATOLOGY

## 2024-10-01 PROCEDURE — 99214 OFFICE O/P EST MOD 30 MIN: CPT | Performed by: INTERNAL MEDICINE

## 2024-10-01 RX ORDER — LORAZEPAM 0.5 MG/1
0.5 TABLET ORAL EVERY 8 HOURS PRN
Qty: 30 TABLET | Refills: 0 | Status: SHIPPED | OUTPATIENT
Start: 2024-10-01 | End: 2024-10-31

## 2024-10-01 RX ORDER — LORAZEPAM 0.5 MG/1
0.5 TABLET ORAL EVERY 8 HOURS PRN
Qty: 10 TABLET | Refills: 0 | Status: SHIPPED | OUTPATIENT
Start: 2024-10-01 | End: 2024-10-01 | Stop reason: SDUPTHER

## 2024-10-01 ASSESSMENT — ENCOUNTER SYMPTOMS
BACK PAIN: 1
NERVOUS/ANXIOUS: 1
CARDIOVASCULAR NEGATIVE: 1
EYES NEGATIVE: 1
CONSTITUTIONAL NEGATIVE: 1
RESPIRATORY NEGATIVE: 1
NEUROLOGICAL NEGATIVE: 1
HEMATOLOGIC/LYMPHATIC NEGATIVE: 1
CONSTIPATION: 1

## 2024-10-01 ASSESSMENT — ACTIVITIES OF DAILY LIVING (ADL)
DOING_HOUSEWORK: INDEPENDENT
BATHING: INDEPENDENT
GROCERY_SHOPPING: INDEPENDENT
TAKING_MEDICATION: INDEPENDENT
MANAGING_FINANCES: INDEPENDENT
DRESSING: INDEPENDENT

## 2024-10-01 ASSESSMENT — PATIENT HEALTH QUESTIONNAIRE - PHQ9
1. LITTLE INTEREST OR PLEASURE IN DOING THINGS: NOT AT ALL
10. IF YOU CHECKED OFF ANY PROBLEMS, HOW DIFFICULT HAVE THESE PROBLEMS MADE IT FOR YOU TO DO YOUR WORK, TAKE CARE OF THINGS AT HOME, OR GET ALONG WITH OTHER PEOPLE: NOT DIFFICULT AT ALL
2. FEELING DOWN, DEPRESSED OR HOPELESS: SEVERAL DAYS
SUM OF ALL RESPONSES TO PHQ9 QUESTIONS 1 AND 2: 1

## 2024-10-01 NOTE — ASSESSMENT & PLAN NOTE
On yearly IV Reclast, last done June 2024   Due for DEXA 11/24    Orders:    XR DEXA bone density; Future

## 2024-10-01 NOTE — ASSESSMENT & PLAN NOTE
Did not respond to SSRI,Buspar,Hydroxyzine,will continue Ativan,CS agreement completed today.  Follow up in 4 months.    Orders:    LORazepam (Ativan) 0.5 mg tablet; Take 1 tablet (0.5 mg) by mouth every 8 hours if needed for anxiety.

## 2024-10-01 NOTE — ASSESSMENT & PLAN NOTE
CS agreement and urine drug screen done today.    Orders:    Drug Screen, Urine With Reflex to Confirmation; Future

## 2024-10-01 NOTE — PROGRESS NOTES
"Subjective   Reason for Visit: Zoraida Mejia is an 79 y.o. female here for a Medicare Wellness visit.     Past Medical, Surgical, and Family History reviewed and updated in chart.    Reviewed all medications by prescribing practitioner or clinical pharmacist (such as prescriptions, OTCs, herbal therapies and supplements) and documented in the medical record.    Here for MCR and  to follow up on medical problems HTN,HLD,Hypothyroid,tubular adenoma,GERD,h/o ovarian cyst,h/o kidney stone ,anxiety,osteoporosis with vertebral Fx.  She still has anxiety on a daily basis.  She self stopped Buspar ,Hydralazine and Sertraline because of not being effective,in past she responded to Ativan  Her cough resolved after stopping Lisinopril,currently on Losartan, home BP at goal except when anxious BP can go up at home.  she has ,HLD, depression,anxiety, hypothyroid,osteoporosis,.taking her meds regularly,no side effects.She also has osteoporosis on IV Reclast and was covered,seen by Dr Higgins , ordered by Dr Higgins.  She had history of multilevel degenerative disc disease lumbar spine and L2 vertebral compression fracture few months ago,seen by [facility],ortho and pain management.she saw ortho,pain management,,had 1 nerve block with some improvement but it came back and has an appt with Dr Masters.  No urinary symptoms or incontinence.no legs weakness.  She denies any depression,.  She refused mammogram(has breast implant,\"had bad experience when she had her mammogram in past\"  Last colonoscopy 2/2024.  Last DEXA 11/2022,on IV Reclast for 2 years,last one was 6/2024    She had her flu and covid vaccine at the pharmacy        Patient Care Team:  Dang Karimi MD as PCP - General  D[facility] Karimi [facility] P[facility]     Review of Systems   Constitutional: Negative.    HENT: Negative.     Eyes: Negative.    Respiratory: Negative.     Cardiovascular: Negative.    Gastrointestinal:  Positive for constipation. " "  Genitourinary: Negative.    Musculoskeletal:  Positive for back pain.   Neurological: Negative.    Hematological: Negative.    Psychiatric/Behavioral:  The patient is nervous/anxious.        Objective   Vitals:  /79 (BP Location: Left arm, Patient Position: Sitting, BP Cuff Size: Adult)   Pulse 72   Temp 35.8 °C (96.5 °F) (Temporal)   Ht 1.568 m (5' 1.75\")   Wt 55 kg (121 lb 3.2 oz)   SpO2 96%   BMI 22.35 kg/m²       Physical Exam  Constitutional:       Appearance: Normal appearance.   HENT:      Head: Normocephalic and atraumatic.   Eyes:      Extraocular Movements: Extraocular movements intact.      Pupils: Pupils are equal, round, and reactive to light.   Cardiovascular:      Rate and Rhythm: Normal rate and regular rhythm.      Heart sounds: Normal heart sounds.   Pulmonary:      Effort: Pulmonary effort is normal.      Breath sounds: Normal breath sounds. No wheezing or rhonchi.   Abdominal:      General: Abdomen is flat. Bowel sounds are normal. There is no distension.      Palpations: Abdomen is soft.   Musculoskeletal:      Cervical back: Normal range of motion and neck supple.      Right lower leg: No edema.      Left lower leg: No edema.   Skin:     General: Skin is warm.   Neurological:      General: No focal deficit present.      Mental Status: She is alert and oriented to person, place, and time.   Psychiatric:         Mood and Affect: Mood normal.         Behavior: Behavior normal.         Assessment & Plan  Routine general medical examination at health care facility    Orders:    1 Year Follow Up In [facility] - PCP - Wellness Exam; Future    Osteoporosis with current pathological fracture with routine healing, unspecified osteoporosis type, subsequent encounter  On yearly IV Reclast, last done June 2024   Due for DEXA 11/24    Orders:    XR DEXA bone density; Future    Asymptomatic menopause    Orders:    XR DEXA bone density; Future    Primary hypertension  Stable on " Losartan .    Orders:    CBC; Future    Comprehensive Metabolic Panel; Future    Acquired hypothyroidism  Stable on levothyroxine.    Orders:    TSH with reflex to Free T4 if abnormal; Future    Hyperlipidemia, unspecified hyperlipidemia type  Stable on Crestor.    Orders:    Lipid Panel; Future    Anxiety  Did not respond to SSRI,Buspar,Hydroxyzine,will continue Ativan,CS agreement completed today.  Follow up in 4 months.    Orders:    LORazepam (Ativan) 0.5 mg tablet; Take 1 tablet (0.5 mg) by mouth every 8 hours if needed for anxiety.    High risk medication use  CS agreement and urine drug screen done today.    Orders:    Drug Screen, Urine With Reflex to Confirmation; Future    Medicare annual wellness visit, subsequent  Pt to bring her advanced directive copy.  She refused mammogram.         Constipation, unspecified constipation type  Continue Miralax,add Benefiber.

## 2024-10-01 NOTE — PROGRESS NOTES
Subjective     Zoraida Mejia is a 79 y.o. female who presents for the following: Cosmetic (Here for Botox treatment. ).     Patient was happy with results from previous clinic visit and would like the same done today to address the forehead, glabella, periorbital, and perioral areas.     Review of Systems:  No other skin or systemic complaints other than what is documented elsewhere in the note.    The following portions of the chart were reviewed this encounter and updated as appropriate:        Skin Cancer History  No skin cancer on file.      Specialty Problems          Dermatology Problems    Facial rhytids    Multiple nevi    Melanocytic nevi of other parts of face    Melanocytic nevi of right lower limb, including hip    Melanocytic nevi of trunk    Other melanin hyperpigmentation    Other seborrheic keratosis    Scar condition and fibrosis of skin    Hemangioma of skin    Lentiginosis        Objective   Well appearing patient in no apparent distress; mood and affect are within normal limits.    A focused skin examination was performed. All findings within normal limits unless otherwise noted below.    Assessment/Plan   1. Wrinkles (14)  Glabella, Left Forehead (3), Left Tillar, Mid Forehead (3), Philtrum, Right Forehead (4), Right Zygomatic Area  Dynamic and static rhytids    Botox is a neurotoxin which prevents muscles from [place] that can help soften and smooth fine lines/wrinkles.  Risks and benefits were discussed bruising and swelling, lid droop, dropping of eyebrow, asymmetrical smile. For continued improvement ongoing treatment with Botox will soften lines  Botox typically begins to work 3 days after injection, full effect by 14 days. Typically lasts about 3 months. Continued treatments will continue to help lines to soften over time. However, lines may not completely disappear.  After treatment do not lie flat for 4 hours and do not exercise for 24 hours after the  procedure.      Chemodenervation - Glabella, Left Forehead (3), Left [place], Mid Forehead (3), Philtrum, Right Forehead (4), Right Zygomatic Area    Performed by: Dena Sanabria DO  Authorized by: Susy Gracia DO      Cosmetic:  yesnot medical botulinum toxin injection    Consent:      Consent obtained:  Written     Consent given by:  Patient    Universal protocol:      Relevant documents present and verified:  Yes       Site/side verified:  Yes       Patient identity confirmed:  Verbally with patient    Pre-procedure details:   Prep type:  Isopropyl alcohol    Procedure details:      Diluted by:  Preservative free saline     Toxin (Brand):  OnaBoNT-A (Botox)     Concentration (u/mL):  2U/0.1mL      Post-procedure details:      Patient tolerance of procedure:  Tolerated well, no immediate complications    Comments: Units injected:  Glabella: 25 (8, 7, 7, 2, 2)   Periorbital: 16 (8,8)   Perioral: 4  Forehead: 6 - Right 4 and Left 2      Related Procedures  Follow Up In Dermatology - Established Patient        Dena Sanabria DO  Department of Dermatology    I saw and evaluated the patient. I personally obtained the key and critical portions of the history and physical exam or was physically present for key and critical portions performed by the resident/fellow. I reviewed the resident/fellow's documentation and discussed the patient with the resident/fellow. I agree with the resident/fellow's medical decision making as documented in the note.    Susy Gracia DO

## 2024-10-08 ENCOUNTER — APPOINTMENT (OUTPATIENT)
Dept: DERMATOLOGY | Facility: CLINIC | Age: 79
End: 2024-10-08
Payer: MEDICARE

## 2024-10-16 ENCOUNTER — APPOINTMENT (OUTPATIENT)
Dept: CARDIOLOGY | Facility: CLINIC | Age: 79
End: 2024-10-16
Payer: MEDICARE

## 2024-11-04 ENCOUNTER — HOSPITAL ENCOUNTER (OUTPATIENT)
Dept: RADIOLOGY | Facility: CLINIC | Age: 79
Discharge: HOME | End: 2024-11-04
Payer: MEDICARE

## 2024-11-04 DIAGNOSIS — Z78.0 ASYMPTOMATIC MENOPAUSE: ICD-10-CM

## 2024-11-04 DIAGNOSIS — M80.00XD OSTEOPOROSIS WITH CURRENT PATHOLOGICAL FRACTURE WITH ROUTINE HEALING, UNSPECIFIED OSTEOPOROSIS TYPE, SUBSEQUENT ENCOUNTER: ICD-10-CM

## 2024-11-04 PROCEDURE — 77080 DXA BONE DENSITY AXIAL: CPT | Performed by: RADIOLOGY

## 2024-11-04 PROCEDURE — 77080 DXA BONE DENSITY AXIAL: CPT

## 2024-12-04 ENCOUNTER — TELEPHONE (OUTPATIENT)
Dept: ORTHOPEDIC SURGERY | Facility: CLINIC | Age: 79
End: 2024-12-04

## 2024-12-04 ENCOUNTER — OFFICE VISIT (OUTPATIENT)
Dept: ORTHOPEDIC SURGERY | Facility: CLINIC | Age: 79
End: 2024-12-04
Payer: MEDICARE

## 2024-12-04 DIAGNOSIS — M48.062 LUMBAR STENOSIS WITH NEUROGENIC CLAUDICATION: ICD-10-CM

## 2024-12-04 DIAGNOSIS — M47.816 LUMBAR SPONDYLOSIS: ICD-10-CM

## 2024-12-04 DIAGNOSIS — S32.020S COMPRESSION FRACTURE OF L2 VERTEBRA, SEQUELA: Primary | ICD-10-CM

## 2024-12-04 PROCEDURE — 99205 OFFICE O/P NEW HI 60 MIN: CPT | Performed by: PHYSICAL MEDICINE & REHABILITATION

## 2024-12-04 PROCEDURE — 1157F ADVNC CARE PLAN IN RCRD: CPT | Performed by: PHYSICAL MEDICINE & REHABILITATION

## 2024-12-04 PROCEDURE — 1123F ACP DISCUSS/DSCN MKR DOCD: CPT | Performed by: PHYSICAL MEDICINE & REHABILITATION

## 2024-12-04 PROCEDURE — 1159F MED LIST DOCD IN RCRD: CPT | Performed by: PHYSICAL MEDICINE & REHABILITATION

## 2024-12-04 PROCEDURE — 1036F TOBACCO NON-USER: CPT | Performed by: PHYSICAL MEDICINE & REHABILITATION

## 2024-12-04 PROCEDURE — 99215 OFFICE O/P EST HI 40 MIN: CPT | Performed by: PHYSICAL MEDICINE & REHABILITATION

## 2024-12-04 PROCEDURE — 1160F RVW MEDS BY RX/DR IN RCRD: CPT | Performed by: PHYSICAL MEDICINE & REHABILITATION

## 2024-12-04 RX ORDER — PREGABALIN 25 MG/1
25 CAPSULE ORAL 2 TIMES DAILY
Qty: 60 CAPSULE | Refills: 1 | Status: SHIPPED | OUTPATIENT
Start: 2024-12-04 | End: 2025-01-03

## 2024-12-04 SDOH — SOCIAL STABILITY: SOCIAL NETWORK: SOCIAL ACTIVITY:: 5

## 2024-12-04 NOTE — PROGRESS NOTES
New Consult/New Patient Note    12/4/2024   No ref. provider found    Assessment: This is a very pleasant 79-year-old female with a history of L2 compression fracture status post kyphoplasty who presents with complaints of low back pain that radiates down her legs.  I did personally review her MRI from May 2023 which does show severe multilevel disease particularly at L2/3 which is a chronic compression fracture with some fragment retropulsion and consequent redundancy of cord and moderate to severe central canal narrowing.  -Previously established with pain management-understands that we do many of the same things but due to convenience of location and timeline for injection options she would like our office to proceed with her plan of care.  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  Component of radicular pain as well  -Bar spondylosis with facet arthropathy    PLAN:  1)  Imaging/Diagnostic Studies: Last MRI is from May 2023; no further imaging needed at this time.  I did personally interpret her imaging, lumbar MRI as well as x-ray.  Blood work also reviewed with no significant renal impairment.  Reviewed Dr. Soto's most recent fluoroscopic images and notes as well, see above.  2)  Therapy/Rehabilitation: We will prescribe new order for physical therapy today.  3)  Pharmacological Management: Will start new prescription for Lyrica 25 mg at bedtime today to gradually increase as needed and as tolerated goal.  I did personally review her most recent lab work showing renal function is within normal limits.  OARRS checked with no suspicious activity and pain agreement was signed.  4)  Spine/Surgical Interventions: Will schedule her for an L4-L5 left paramedian interlaminar epidural steroid injection as this has worked well for her in the past and the holidays are  approaching.  This to be performed under fluoroscopic guidance and will be therapeutic.  5)  Alternative Treatments: May consider alternative treatment options in the future including manipulation (chiropractor versus osteopathic) and/or acupuncture if patient does not obtain optimal relief with initial treatment plan.  6)  Consultations: Physical therapy  7)  Follow -up: 4-6 weeks or PRN if symptoms worsen/do not improve.   8)  Future treatment considerations: Could consider transforaminal epidural steroid injection.  Further titration of Lyrica.    Patient advised of the difference between hurt and harm and advised to continue with all normal activities and exercises. Patient verbalized understanding of the above plan and was happy with the care provided.      The above clinical summary has been dictated with voice recognition software. It has not been proofread for grammatical errors, typographical mistakes, or other semantic inconsistencies.    Thank you for visiting our office today. It was our pleasure to take part in your healthcare.     Do not hesitate to call with any questions regarding your plan of care after leaving at [phone]    To clinicians, thank you very much for this kind referral. It is a privilege to partner with you in the care of your patients. My office would be delighted to assist you with any further consultations or with questions regarding the plan of care outlined. Do not hesitate to call the office or contact me directly.     Sincerely,    Patient seen and discussed with attending. Note is not finalized until signed by attending physician.     Raul Pena,  PGY3  Physical Medicine & Rehabilitation     Seen with resident Dr. Pena, note above and below is a joint effort in all areas.    I saw and evaluated the patient. I personally obtained the key and critical portions of the history and physical exam. I reviewed the resident's documentation and discussed the patient with the  resident. I agree with the resident's medical decision making as documented in the resident's note, with my additions.    OLLIE Sheikh MD  , Physical Medicine and Rehabilitation, Orthopedic Spine  [facility] [employer]  [facility] [place]       Zoraida Mejia   is a 79 y.o. female who presents with complaints of low back pain.  She tells me she has had a compression fracture and underwent kyphoplasty in February 2023 that did not help very much.  She has been following with Dr. Huffman for pain management and has undergone 3 epidural steroid ejections in her back which she is unsure of provided good relief.  Her pain is mainly in her low back and travels down her right leg and occasionally her left.  Travels down the posterior aspect of her leg into her foot and she does get occasional numbness.  Pain is described as a constant ache and sometimes sharp, worse in the morning and with position changes.  Pain is 80/20 in her right leg worse left and is 7/10 in her back and 3/10 in her leg.  She can walk for approximately 10 to 15 minutes before she has to sit due to pain.      Location: low back   Radiation: bilateral legs, right worse than left   Quality:  constant ache, 7/10 low back, 3/10 legs  Exacerbated by position changes  Relieved by rest   Onset, traumatic event: none notable  Has tried:  Gabapentin, tylenol, ibuprofen, methocarbamol, meloxicam    Patient denies bowel/bladder incontinence.      PREVIOUS TREATMENTS  IN THE LAST SIX MONTHS     Active conservative therapy  in the last six months (see below)              1. Physical therapy:   May of last year, helpful                                                                               2. Home exercise program after PT: Occasionally                                                 3. A physician supervised home exercise program (HEP):   Yes              4. Chiropractic Care:  No                                                                     Passive conservative therapy  in the last six months (see below)              1. NSAIDS:                                                                                                           2. Prescription pain medication:                                                              3. Acupuncture:   no                                                                                          4. Tens unit: no      ROS: Other than listed in HPI, PMHX below, and intake paperwork including a 30 point patient-recorded review of symptoms which was personally reviewed and inclusive of no history of unintentional weight loss, change in appetite, significant malaise, fevers, chills, or change in bowel/bladder, shortness of breath, or chest pain.    I have confirmed and edited as necessary Past Medical, Past Surgical, Family, Social History and ROS as obtained by others. These were also obtained on new patient forms.      PHYSICAL EXAM:   GENERAL APPEARANCE:  Well nourished, well developed, and no apparent distress.  NEURO PSYCH: Patient oriented to person, place, Mood pleasant. Benign affect.  MUSCULOSKELETAL and NEUROLOGICAL       VISUAL INSPECTION           LUMBAR: WNL  SPINE ROM:   CERVICAL ROM: Within functional limits.   LUMBAR ROM: Good lumbar flexion and extension range of motion. Exacerbation of pain and reproduction of radicular symptoms with lumbar extension, particularly down left leg.       PALPATION:           SPINOUS PROCESS AND PARASPINALS: Generalized tenderness along spinous processes and paraspinals in lumbar region.    GLUTEAL MUSCULATURE: Tenderness to right gluteal musculature.   FACET LOADING: Positive with right sided facet loading.   MUSCLE BULK: Normal and symmetrical in the upper & lower extremities.  MUSCLE TONE: Normal  MOTOR: 4/5 bilateral hip flexors. Otherwise 5/5 in bilateral lower extremities.   SENSORY: Some numbness to  palpation in left foot along L5/S1 dermatome.   GAIT: Normal.  Able to go up and heels and toes with no sig. weakness.  No sig. balance deficit appreciated  REFLEXES: +2 to bilateral lower extremities.   STRAIGHT LEG TEST: Negative bilaterally.   PERIPHERAL JOINT ROM:   HIP ROM: Full bilaterally  DALLAS/FADIR/Thigh Thrust/Compression: DALLAS weakly positive left side. Compression weakly left side.   GAENSLEN'S: Weakly positive bilaterally.     Hip Exam including thigh thrust and LOG ROLL: Negative bilaterally    DATA REVIEW:   The below imaging studies were personally reviewed and discussed with the patient.    Medical Decision Making:  The above note constitutes a Moderate to High level of medical decision making based on past data and imaging review, new and chronic symptoms with exacerbation, change in weakness or sensation, new imaging and diagnostic studies ordered, discussion of potential interventional or surgical treatment options, acute or chronic pain that may pose a threat to bodily function.    Past Medical History:   Diagnosis Date    Atypical nevi     Other conditions influencing health status 12/02/2013    Tongue Lesion Mass (___ Cm)    Personal history of other diseases of the nervous system and sense organs     History of cataract    Personal history of other diseases of the respiratory system 04/16/2013    History of acute pharyngitis    Personal history of urinary calculi 09/21/2015    History of renal calculi    Personal history of urinary calculi 03/13/2017    History of kidney stones       Medication Documentation Review Audit       Reviewed by Sharona Lamb RN (Registered Nurse) on 12/04/24 at 0902      Medication Order Taking? Sig Documenting Provider Last Dose Status   bisacodyl (Dulcolax) 5 mg EC tablet [id number] Yes Take 1 tablet (5 mg) by mouth once daily as needed. Historical Provider, MD  Active   levothyroxine (Synthroid, Levoxyl) 25 mcg tablet [id number] Yes Take 1 tablet (25 mcg) by mouth  once daily. Dang Karimi MD  Active   LORazepam (Ativan) 0.5 mg tablet [id number]  Take 1 tablet (0.5 mg) by mouth every 8 hours if needed for anxiety. Dang Karimi MD   10/31/24 8608   losartan (Cozaar) 25 mg tablet [id number] Yes Take 1 tablet (25 mg) by mouth once daily. Dang Karimi MD  Active   meclizine (Antivert) 12.5 mg tablet [id number] Yes Take 1 tablet (12.5 mg) by mouth 3 times a day as needed for dizziness. Dang Karimi MD  Active   polyethylene glycol (Glycolax) 17 gram/dose powder [id number] Yes Take by mouth once daily. MIX 1 CAPFUL (17GM) IN 8 OUNCES OF WATER, JUICE, OR TEA AND DRINK DAILY. Dreek Bush MD  Active   rosuvastatin (Crestor) 5 mg tablet [id number] Yes Take 1 tablet (5 mg) by mouth once daily. Dang Karimi MD  Active   zoledronic acid (Reclast) 5 mg/100 mL piggyback [id number]  Infuse 5mg IV once annually - due in 2024 Jamia Wynn MD  Active                    No Known Allergies    Social History     Socioeconomic History    Marital status:      Spouse name: Not on file    Number of children: 4    Years of education: Not on file    Highest education level: Not on file   Occupational History    Not on file   Tobacco Use    Smoking status: Never     Passive exposure: Past    Smokeless tobacco: Never   Vaping Use    Vaping status: Never Used   Substance and Sexual Activity    Alcohol use: Yes    Drug use: Never    Sexual activity: Not on file   Other Topics Concern    Not on file   Social History Narrative    Not on file     Social Drivers of Health     Financial Resource Strain: Not on file   Food Insecurity: Not on file   Transportation Needs: Not on file   Physical Activity: Not on file   Stress: Not on file   Social Connections: Not on file   Intimate Partner Violence: Not on file   Housing Stability: Not on file       Past Surgical History:   Procedure Laterality Date    ANKLE SURGERY  2014    Ankle Surgery    CATARACT  EXTRACTION  01/28/2014    Cataract Surgery    OTHER SURGICAL HISTORY  04/16/2013    Breast Surgery Enlargement Procedure    OTHER SURGICAL HISTORY  01/28/2014    Uterine Myomectomy    OTHER SURGICAL HISTORY  01/28/2014    Renal Endoscopy Through Nephrostomy With Calculus Removal    OTHER SURGICAL HISTORY  01/28/2014    Treatment Of Ankle Fracture    RETINAL DETACHMENT SURGERY  01/28/2014    Repair Of Retinal Detachment

## 2024-12-04 NOTE — TELEPHONE ENCOUNTER
[employer] and [employer] called to obtain prior auth  [employer] could not locate patient and [employer] experiencing high volume calls and requesting call back in 24 hours.

## 2024-12-16 DIAGNOSIS — E03.9 ACQUIRED HYPOTHYROIDISM: ICD-10-CM

## 2024-12-16 RX ORDER — LEVOTHYROXINE SODIUM 25 UG/1
25 TABLET ORAL DAILY
Qty: 90 TABLET | Refills: 3 | Status: SHIPPED | OUTPATIENT
Start: 2024-12-16

## 2025-01-28 ENCOUNTER — APPOINTMENT (OUTPATIENT)
Dept: CARDIOLOGY | Facility: CLINIC | Age: 80
End: 2025-01-28
Payer: MEDICARE

## 2025-01-28 LAB
ALBUMIN SERPL-MCNC: 4.8 G/DL (ref 3.6–5.1)
ALP SERPL-CCNC: 81 U/L (ref 37–153)
ALT SERPL-CCNC: 19 U/L (ref 6–29)
ANION GAP SERPL CALCULATED.4IONS-SCNC: 10 MMOL/L (CALC) (ref 7–17)
AST SERPL-CCNC: 22 U/L (ref 10–35)
BILIRUB SERPL-MCNC: 0.5 MG/DL (ref 0.2–1.2)
BUN SERPL-MCNC: 16 MG/DL (ref 7–25)
CALCIUM SERPL-MCNC: 9.3 MG/DL (ref 8.6–10.4)
CHLORIDE SERPL-SCNC: 101 MMOL/L (ref 98–110)
CHOLEST SERPL-MCNC: 210 MG/DL
CHOLEST/HDLC SERPL: 2.8 (CALC)
CO2 SERPL-SCNC: 27 MMOL/L (ref 20–32)
CREAT SERPL-MCNC: 0.9 MG/DL (ref 0.6–1)
EGFRCR SERPLBLD CKD-EPI 2021: 65 ML/MIN/1.73M2
ERYTHROCYTE [DISTWIDTH] IN BLOOD BY AUTOMATED COUNT: 13.5 % (ref 11–15)
GLUCOSE SERPL-MCNC: 95 MG/DL (ref 65–99)
HCT VFR BLD AUTO: 43 % (ref 35–45)
HDLC SERPL-MCNC: 74 MG/DL
HGB BLD-MCNC: 14.2 G/DL (ref 11.7–15.5)
LDLC SERPL CALC-MCNC: 114 MG/DL (CALC)
MCH RBC QN AUTO: 29.8 PG (ref 27–33)
MCHC RBC AUTO-ENTMCNC: 33 G/DL (ref 32–36)
MCV RBC AUTO: 90.3 FL (ref 80–100)
NONHDLC SERPL-MCNC: 136 MG/DL (CALC)
PLATELET # BLD AUTO: 346 THOUSAND/UL (ref 140–400)
PMV BLD REES-ECKER: 9.9 FL (ref 7.5–12.5)
POTASSIUM SERPL-SCNC: 4.1 MMOL/L (ref 3.5–5.3)
PROT SERPL-MCNC: 7.5 G/DL (ref 6.1–8.1)
RBC # BLD AUTO: 4.76 MILLION/UL (ref 3.8–5.1)
SODIUM SERPL-SCNC: 138 MMOL/L (ref 135–146)
TRIGL SERPL-MCNC: 113 MG/DL
TSH SERPL-ACNC: 2.48 MIU/L (ref 0.4–4.5)
WBC # BLD AUTO: 8.1 THOUSAND/UL (ref 3.8–10.8)

## 2025-01-29 ENCOUNTER — APPOINTMENT (OUTPATIENT)
Dept: ORTHOPEDIC SURGERY | Facility: CLINIC | Age: 80
End: 2025-01-29
Payer: MEDICARE

## 2025-02-03 ENCOUNTER — APPOINTMENT (OUTPATIENT)
Dept: PRIMARY CARE | Facility: CLINIC | Age: 80
End: 2025-02-03
Payer: MEDICARE

## 2025-02-03 VITALS
HEART RATE: 75 BPM | DIASTOLIC BLOOD PRESSURE: 80 MMHG | SYSTOLIC BLOOD PRESSURE: 136 MMHG | TEMPERATURE: 95.6 F | WEIGHT: 123.2 LBS | BODY MASS INDEX: 22.72 KG/M2 | OXYGEN SATURATION: 97 %

## 2025-02-03 DIAGNOSIS — E78.5 HYPERLIPIDEMIA, UNSPECIFIED HYPERLIPIDEMIA TYPE: Primary | ICD-10-CM

## 2025-02-03 DIAGNOSIS — M80.00XD OSTEOPOROSIS WITH CURRENT PATHOLOGICAL FRACTURE WITH ROUTINE HEALING, UNSPECIFIED OSTEOPOROSIS TYPE, SUBSEQUENT ENCOUNTER: ICD-10-CM

## 2025-02-03 DIAGNOSIS — Z13.6 SCREENING FOR HEART DISEASE: ICD-10-CM

## 2025-02-03 DIAGNOSIS — E03.9 ACQUIRED HYPOTHYROIDISM: ICD-10-CM

## 2025-02-03 DIAGNOSIS — I10 PRIMARY HYPERTENSION: ICD-10-CM

## 2025-02-03 DIAGNOSIS — F41.9 ANXIETY: ICD-10-CM

## 2025-02-03 PROCEDURE — 1159F MED LIST DOCD IN RCRD: CPT | Performed by: INTERNAL MEDICINE

## 2025-02-03 PROCEDURE — 1157F ADVNC CARE PLAN IN RCRD: CPT | Performed by: INTERNAL MEDICINE

## 2025-02-03 PROCEDURE — 3075F SYST BP GE 130 - 139MM HG: CPT | Performed by: INTERNAL MEDICINE

## 2025-02-03 PROCEDURE — 99214 OFFICE O/P EST MOD 30 MIN: CPT | Performed by: INTERNAL MEDICINE

## 2025-02-03 PROCEDURE — 1123F ACP DISCUSS/DSCN MKR DOCD: CPT | Performed by: INTERNAL MEDICINE

## 2025-02-03 PROCEDURE — 3079F DIAST BP 80-89 MM HG: CPT | Performed by: INTERNAL MEDICINE

## 2025-02-03 PROCEDURE — 1160F RVW MEDS BY RX/DR IN RCRD: CPT | Performed by: INTERNAL MEDICINE

## 2025-02-03 PROCEDURE — G2211 COMPLEX E/M VISIT ADD ON: HCPCS | Performed by: INTERNAL MEDICINE

## 2025-02-03 RX ORDER — ROSUVASTATIN CALCIUM 5 MG/1
5 TABLET, COATED ORAL DAILY
Qty: 90 TABLET | Refills: 3 | Status: SHIPPED | OUTPATIENT
Start: 2025-02-03 | End: 2026-02-03

## 2025-02-03 NOTE — ASSESSMENT & PLAN NOTE
Did not respond to SSRI,Buspar,Hydroxyzine,will continue Ativan,CS agreement completed today.  Follow up in 4 months.

## 2025-02-06 ENCOUNTER — APPOINTMENT (OUTPATIENT)
Dept: PHYSICAL THERAPY | Facility: CLINIC | Age: 80
End: 2025-02-06
Payer: MEDICARE

## 2025-02-06 ASSESSMENT — ENCOUNTER SYMPTOMS
RESPIRATORY NEGATIVE: 1
CARDIOVASCULAR NEGATIVE: 1

## 2025-02-13 ENCOUNTER — APPOINTMENT (OUTPATIENT)
Dept: PHYSICAL THERAPY | Facility: CLINIC | Age: 80
End: 2025-02-13
Payer: MEDICARE

## 2025-02-20 ENCOUNTER — APPOINTMENT (OUTPATIENT)
Dept: PHYSICAL THERAPY | Facility: CLINIC | Age: 80
End: 2025-02-20
Payer: MEDICARE

## 2025-02-25 ENCOUNTER — APPOINTMENT (OUTPATIENT)
Dept: DERMATOLOGY | Facility: CLINIC | Age: 80
End: 2025-02-25
Payer: MEDICARE

## 2025-03-18 NOTE — PROGRESS NOTES
Subjective     Zoraida Mejia is a 79 y.o. female who presents for the following: Skin Check (Annual FBSE. Hx of ACMN. No areas of concern. //Pt needs refill of her Retin-A cream ).     Skin Cancer Screening  She has a history of heavy sun exposure. She is in the sun occasionally. She uses sunscreen infrequently. She reports no skin symptoms. Her moles are not changing.    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    The following portions of the chart were reviewed this encounter and updated as appropriate:       Skin Cancer History      Specialty Problems          Dermatology Problems    Facial rhytids    Multiple nevi    Melanocytic nevi of other parts of face    Melanocytic nevi of right lower limb, including hip    Melanocytic nevi of trunk    Other melanin hyperpigmentation    Other seborrheic keratosis    Scar condition and fibrosis of skin    Hemangioma of skin    Lentiginosis     Past Medical History:  Zoraida Mejia  has a past medical history of Atypical nevi, Other conditions influencing health status (12/02/2013), Personal history of other diseases of the nervous system and sense organs, Personal history of other diseases of the respiratory system (04/16/2013), Personal history of urinary calculi (09/21/2015), and Personal history of urinary calculi (03/13/2017).    Past Surgical History:  Zoraida Mejia  has a past surgical history that includes Other surgical history (04/16/2013); Ankle surgery (02/16/2014); Retinal detachment surgery (01/28/2014); Other surgical history (01/28/2014); Cataract extraction (01/28/2014); Other surgical history (01/28/2014); and Other surgical history (01/28/2014).    Family History:  Patient family history includes Heart disease in her mother.       Objective   Well appearing patient in no apparent distress; mood and affect are within normal  limits.    A full examination was performed including scalp, head, eyes, ears, nose, lips, neck, chest, axillae, abdomen, back, buttocks, bilateral upper extremities, bilateral lower extremities, hands, feet, fingers, toes, fingernails, and toenails. All findings within normal limits unless otherwise noted below.    Assessment/Plan   1. Scar conditions and fibrosis of skin  Right Upper Arm - Anterior  Well healed scar at site of prior ACMN excision in 2019    No recurrence noted, continue with regular screening for skin cancer    Related Procedures  Follow Up In Dermatology - Established Patient  Follow Up In Dermatology - Established Patient    2. Hemangioma of skin  Scattered cherry-red papule(s).    This is a benign finding and requires no treatment.      3. Lentigo  Scattered tan macules in sun-exposed areas.    The ABCDEs of melanoma and warning signs of non-melanoma skin cancer were discussed with patient and patient expressed understanding. Sun protection and use of at least SPF 30 discussed with patient. Pt instructed to reapply every 2 hours.     4. Seborrheic keratosis  Stuck on verrucous, tan-brown papules and plaques.      The benign nature of the diagnosis was explained to patient. Risks, benefits, side effects, alternatives and options were discussed with patient and the patient voiced understanding.    ISK on L cheek treated x 2 cycles with LN2; wound care d/w pt and pt expressed understanding.       5. Encounter for screening for malignant neoplasm of skin    6. Skin cancer screening    Related Procedures  Follow Up In Dermatology - Established Patient    7. Melanocytic nevus, unspecified location  All nevi were symmetric brown macules without atypia on dermoscopy.       - Sun protective behavior reviewed and encouraged including the use of over-the-counter broad spectrum sunscreen with SPF30+ daily (reapply every 1-1.5 hours when outdoors), UPF clothing, broad rimmed hats, sunglasses, and avoidance  of midday sun. Home skin monitoring encouraged and how to monitor for skin cancer (changing or new moles, new rapidly growing or non-healing lesions) reviewed.       8. Photoaging of skin  Head - Anterior (Face)  Diffuse photodamage with actinic changes with telangiectasia and mottled pigmentation in sun-exposed areas.       The signs and symptoms of skin cancer were reviewed and the patient was advised to practice sun protection and sun avoidance, use daily sunscreen, and perform regular self skin exams.  Sun protection was discussed, including avoiding the mid-day sun, wearing a sunscreen with SPF at least 50, and stressing the need for reapplication of sunscreen and applying more than they think they need.   - pt's tretinoin 0.025% cream was refilled today    tretinoin (Retin-A) 0.025 % cream - Head - Anterior (Face)  Apply a small 1/2 pea sized amount to clean dry face at bedtime.  Start off 2x/week and increase as tolerated.    F/u in 1yr  Pt seen by Dr. Eveline Guerra, PGY-2    I saw and evaluated the patient. I personally obtained the key and critical portions of the history and physical exam or was physically present for key and critical portions performed by the student/resident. I reviewed the student/resident's documentation and discussed the patient with the student/resident. I was present for the entirety of any procedure(s). I agree with the student/resident's medical decision making as documented in the note.

## 2025-03-19 ENCOUNTER — APPOINTMENT (OUTPATIENT)
Dept: DERMATOLOGY | Facility: CLINIC | Age: 80
End: 2025-03-19
Payer: MEDICARE

## 2025-03-19 DIAGNOSIS — L90.5 SCAR CONDITIONS AND FIBROSIS OF SKIN: Primary | ICD-10-CM

## 2025-03-19 DIAGNOSIS — D22.9 MELANOCYTIC NEVUS, UNSPECIFIED LOCATION: ICD-10-CM

## 2025-03-19 DIAGNOSIS — L81.4 LENTIGO: ICD-10-CM

## 2025-03-19 DIAGNOSIS — L57.8 PHOTOAGING OF SKIN: ICD-10-CM

## 2025-03-19 DIAGNOSIS — D18.01 HEMANGIOMA OF SKIN: ICD-10-CM

## 2025-03-19 DIAGNOSIS — L82.1 SEBORRHEIC KERATOSIS: ICD-10-CM

## 2025-03-19 DIAGNOSIS — Z12.83 SKIN CANCER SCREENING: ICD-10-CM

## 2025-03-19 DIAGNOSIS — Z12.83 ENCOUNTER FOR SCREENING FOR MALIGNANT NEOPLASM OF SKIN: ICD-10-CM

## 2025-03-19 PROCEDURE — 1123F ACP DISCUSS/DSCN MKR DOCD: CPT | Performed by: DERMATOLOGY

## 2025-03-19 PROCEDURE — 1157F ADVNC CARE PLAN IN RCRD: CPT | Performed by: DERMATOLOGY

## 2025-03-19 PROCEDURE — 99213 OFFICE O/P EST LOW 20 MIN: CPT | Performed by: DERMATOLOGY

## 2025-03-19 PROCEDURE — 1159F MED LIST DOCD IN RCRD: CPT | Performed by: DERMATOLOGY

## 2025-03-19 RX ORDER — TRETINOIN 0.25 MG/G
CREAM TOPICAL
Qty: 45 G | Refills: 3 | Status: SHIPPED | OUTPATIENT
Start: 2025-03-19

## 2025-03-26 DIAGNOSIS — I10 PRIMARY HYPERTENSION: ICD-10-CM

## 2025-03-26 RX ORDER — LOSARTAN POTASSIUM 25 MG/1
25 TABLET ORAL DAILY
Qty: 90 TABLET | Refills: 3 | Status: SHIPPED | OUTPATIENT
Start: 2025-03-26

## 2025-04-01 ENCOUNTER — OFFICE VISIT (OUTPATIENT)
Dept: CARDIOLOGY | Facility: CLINIC | Age: 80
End: 2025-04-01
Payer: MEDICARE

## 2025-04-01 VITALS
WEIGHT: 121 LBS | SYSTOLIC BLOOD PRESSURE: 136 MMHG | HEIGHT: 62 IN | DIASTOLIC BLOOD PRESSURE: 79 MMHG | OXYGEN SATURATION: 98 % | HEART RATE: 78 BPM | BODY MASS INDEX: 22.26 KG/M2

## 2025-04-01 DIAGNOSIS — E78.5 HYPERLIPIDEMIA, UNSPECIFIED HYPERLIPIDEMIA TYPE: Primary | ICD-10-CM

## 2025-04-01 DIAGNOSIS — I10 PRIMARY HYPERTENSION: ICD-10-CM

## 2025-04-01 PROCEDURE — 1036F TOBACCO NON-USER: CPT | Performed by: INTERNAL MEDICINE

## 2025-04-01 PROCEDURE — 93005 ELECTROCARDIOGRAM TRACING: CPT | Performed by: INTERNAL MEDICINE

## 2025-04-01 PROCEDURE — 1160F RVW MEDS BY RX/DR IN RCRD: CPT | Performed by: INTERNAL MEDICINE

## 2025-04-01 PROCEDURE — 93010 ELECTROCARDIOGRAM REPORT: CPT | Performed by: INTERNAL MEDICINE

## 2025-04-01 PROCEDURE — 1123F ACP DISCUSS/DSCN MKR DOCD: CPT | Performed by: INTERNAL MEDICINE

## 2025-04-01 PROCEDURE — 1126F AMNT PAIN NOTED NONE PRSNT: CPT | Performed by: INTERNAL MEDICINE

## 2025-04-01 PROCEDURE — 99215 OFFICE O/P EST HI 40 MIN: CPT | Mod: 25 | Performed by: INTERNAL MEDICINE

## 2025-04-01 PROCEDURE — 3075F SYST BP GE 130 - 139MM HG: CPT | Performed by: INTERNAL MEDICINE

## 2025-04-01 PROCEDURE — 1157F ADVNC CARE PLAN IN RCRD: CPT | Performed by: INTERNAL MEDICINE

## 2025-04-01 PROCEDURE — 3078F DIAST BP <80 MM HG: CPT | Performed by: INTERNAL MEDICINE

## 2025-04-01 PROCEDURE — 1159F MED LIST DOCD IN RCRD: CPT | Performed by: INTERNAL MEDICINE

## 2025-04-01 PROCEDURE — 99215 OFFICE O/P EST HI 40 MIN: CPT | Performed by: INTERNAL MEDICINE

## 2025-04-01 RX ORDER — ROSUVASTATIN CALCIUM 10 MG/1
10 TABLET, COATED ORAL DAILY
Qty: 90 TABLET | Refills: 3 | Status: SHIPPED | OUTPATIENT
Start: 2025-04-01 | End: 2026-04-01

## 2025-04-01 ASSESSMENT — PAIN SCALES - GENERAL: PAINLEVEL_OUTOF10: 0-NO PAIN

## 2025-04-01 NOTE — PROGRESS NOTES
CHIEF COMPLAINT: Establish care   HISTORY OF PRESENT ILLNESS:  This is a 79 year old female with a past medical history of HTN, HLD, and hypothyroidism who presents to Cardiology clinic today to re-establish care with Dr. Farmer, last seen 08/2019.     Per chart review, previously followed with Cardiology; last seen by Dr. Mcclain 11/2023 for the evaluation of hypertension and HLD. At this time she was started on lisinopril 2.5 mg every day and continued on Crestor 5 mg daily. Coronary calcium score for risk stratification was considered; however, as she was already on statin therapy, with plans for up titration, ultimately felt that it would not , so therefore deferred. Last seen by Dr. Farmer 2019.    Today she is well appearing. She denies cardiac symptoms including chest pain, LH/dizziness, syncope, palpitations, orthopnea, PND, LE edema, and SOB. She is not physically active, but reprots that she can walk >2-3 flights of stairs without SOB or chest pain. She is planning to increase her activity level now that the weather is improving. She reports that her diet is poor, mostly consisting of pastas, cheese, and 2-3 servings of fruit/veggies/daily. She reports a 5-6 lb weight gain in 2024. She does not take her BP at home. She denies a history of DM and therefore does not take her sugars. She reports medication compliance; however, she is hopeful to titrate off of her medications for HTN and HLD. Despite this she is tolerating them without side effects.     Prior cardiac testing:   CT Cardiac scoring:  - ordered, pending completion 04/29/2025    Vascular US Carotid artery duplex, bilateral (10/2020):  IMPRESSION:  Normal flow pattern without evidence of hemodynamically relevant  stenosis or atheromatous plaques in the visualized portions of the  carotid circulation as described above.    Exercise stress echocardiogram (05/2019):  Unable to view results, but per  last note- Treadmill  stress echo in 5/2019 negative for ischemia and no exercise-induced changes in diastology      PAST MEDICAL HISTORY  Past Medical History:   Diagnosis Date    Atypical nevi     Other conditions influencing health status 12/02/2013    Tongue Lesion Mass (___ Cm)    Personal history of other diseases of the nervous system and sense organs     History of cataract    Personal history of other diseases of the respiratory system 04/16/2013    History of acute pharyngitis    Personal history of urinary calculi 09/21/2015    History of renal calculi    Personal history of urinary calculi 03/13/2017    History of kidney stones       PAST SURGICAL HISTORY  Past Surgical History:   Procedure Laterality Date    ANKLE SURGERY  02/16/2014    Ankle Surgery    CATARACT EXTRACTION  01/28/2014    Cataract Surgery    OTHER SURGICAL HISTORY  04/16/2013    Breast Surgery Enlargement Procedure    OTHER SURGICAL HISTORY  01/28/2014    Uterine Myomectomy    OTHER SURGICAL HISTORY  01/28/2014    Renal Endoscopy Through Nephrostomy With Calculus Removal    OTHER SURGICAL HISTORY  01/28/2014    Treatment Of Ankle Fracture    RETINAL DETACHMENT SURGERY  01/28/2014    Repair Of Retinal Detachment       FAMILY HISTORY  Family History   Problem Relation Name Age of Onset    Heart disease Mother          cardiac disorder     ALLERGIES  No Known Allergies    MEDICATIONS  Current Outpatient Medications   Medication Instructions    bisacodyl (Dulcolax) 5 mg EC tablet 1 tablet, Daily PRN    levothyroxine (SYNTHROID, LEVOXYL) 25 mcg, oral, Daily    LORazepam (ATIVAN) 0.5 mg, oral, Every 8 hours PRN    losartan (COZAAR) 25 mg, oral, Daily    meclizine (ANTIVERT) 12.5 mg, oral, 3 times daily PRN    pregabalin (LYRICA) 25 mg, oral, 2 times daily    rosuvastatin (CRESTOR) 5 mg, oral, Daily    tretinoin (Retin-A) 0.025 % cream Apply a small 1/2 pea sized amount to clean dry face at bedtime.  Start off 2x/week and increase as tolerated.    zoledronic acid  (Reclast) 5 mg/100 mL piggyback Infuse 5mg IV once annually - due in June 2024       VITALS  Vitals:    04/01/25 0903   BP: 136/79   Pulse:    SpO2:          PHYSICAL EXAM:  General: Resting comfortably in exam chair. Well developed, well nourished. Appears stated age. In no acute distress   HEENT: Normocephalic and atraumatic. EOMI, sclera non-icteric, MMM, no JVD  Cardiovascular: RRR, no r/m/g  Pulmonary: Lungs clear to auscultation bilaterally. No wheezes/ rhonchi/ rales   GI: +BS, soft, non-tender, non-distended  : No suprapubic/ flank pain  Extremities: No LE edema   Skin: warm and dry. No rashes or lesions   Neurologic: CN II-XII grossly intact. No focal neurologic deficit   Psych: Pleasant. Appropriate mood and affect     LABS  WHITE BLOOD CELL COUNT (Thousand/uL)   Date Value   01/27/2025 8.1     HEMOGLOBIN (g/dL)   Date Value   01/27/2025 14.2     PLATELET COUNT (Thousand/uL)   Date Value   01/27/2025 346     SODIUM (mmol/L)   Date Value   01/27/2025 138     POTASSIUM (mmol/L)   Date Value   01/27/2025 4.1     CHLORIDE (mmol/L)   Date Value   01/27/2025 101     CARBON DIOXIDE (mmol/L)   Date Value   01/27/2025 27     UREA NITROGEN (BUN) (mg/dL)   Date Value   01/27/2025 16     CREATININE (mg/dL)   Date Value   01/27/2025 0.90     CALCIUM (mg/dL)   Date Value   01/27/2025 9.3     PROTEIN, TOTAL (g/dL)   Date Value   01/27/2025 7.5     BILIRUBIN, TOTAL (mg/dL)   Date Value   01/27/2025 0.5     ALKALINE PHOSPHATASE (U/L)   Date Value   01/27/2025 81     ALT (U/L)   Date Value   01/27/2025 19     AST (U/L)   Date Value   01/27/2025 22     GLUCOSE (mg/dL)   Date Value   01/27/2025 95     Cholesterol (mg/dL)   Date Value   01/04/2024 178   11/29/2023 179   09/20/2023 239 (H)   01/24/2023 197   10/19/2022 240 (H)     CHOLESTEROL, TOTAL (mg/dL)   Date Value   01/27/2025 210 (H)     LDL-CHOLESTEROL (mg/dL (calc))   Date Value   01/27/2025 114 (H)     LDL Calculated (mg/dL)   Date Value   01/04/2024 80   11/29/2023  87     HDL CHOLESTEROL (mg/dL)   Date Value   01/27/2025 74     HDL-Cholesterol (mg/dL)   Date Value   01/04/2024 78.4   11/29/2023 72.7     HDL (mg/dL)   Date Value   09/20/2023 71.0   01/24/2023 56.3   10/19/2022 70.0     TRIGLYCERIDES (mg/dL)   Date Value   01/27/2025 113     Triglycerides (mg/dL)   Date Value   01/04/2024 97   11/29/2023 98   09/20/2023 150 (H)   01/24/2023 130   10/19/2022 126     Lab Results   Component Value Date    LDLF 138 (H) 09/20/2023       ASSESSMENT/PLAN:  This is a 79 year old female with a past medical history of HTN, HLD, and hypothyroidism who presents to Cardiology clinic today to re-establish care with Dr. Farmer. Today she is well appearing and without acute complaint. Overall, blood pressure and LDL both above goal. Discussed lifestyle modifications including Mediterranean diet and regular cardiovascular exercise. Encouraged her to keep a home BP log for further medication titration. Will increase Crestor to 10 mg daily and follow up CT calcium scoring and repeat lipid panel in 07/2025. RTC in 1 year or as needed.     #HTN  :: home meds: Losartan 25 mg every day   :: CMP (01/2025)- wnl   :: Home Bps: does not take at home   :: BP today: 136/79, goal SBP <130  Plan:  - Continue with Losartan 25 mg every day for now   - Patient to keep home BP log, measure twice daily for 7-10 days and fax results to office for consideration of further titration     #HLD  :: Lipid (01/2025)- cholesterol 210, HDL 74, ,   :: CMP (01/2025)- wnl   :: home meds- crestor 5 mg every day     The 10-year ASCVD risk score (Fermin CORREA, et al., 2019) is: 35.8%    Values used to calculate the score:      Age: 79 years      Sex: Female      Is Non- : No      Diabetic: No      Tobacco smoker: No      Systolic Blood Pressure: 136 mmHg      Is BP treated: Yes      HDL Cholesterol: 74 mg/dL      Total Cholesterol: 210 mg/dL    Plan:  - Increase Crestor to 10 mg every day   -  Follow up CT Calcium scoring   - repeat Lipid panel 07/2025  - RTC in 1 year     #Hypothyroidism   :: home meds: levothyroxine 25 mcg every day   :: TSH (01/2025) wnl   Plan:  - c/w levothyroxine 25 mcg every day     RTC: 1 year or PRN     Frandy Freeman MD  PGY-2 Internal Medicine, [facility]

## 2025-04-03 LAB
ATRIAL RATE: 70 BPM
P AXIS: 72 DEGREES
P OFFSET: 201 MS
P ONSET: 157 MS
PR INTERVAL: 124 MS
Q ONSET: 219 MS
QRS COUNT: 11 BEATS
QRS DURATION: 90 MS
QT INTERVAL: 410 MS
QTC CALCULATION(BAZETT): 442 MS
QTC FREDERICIA: 431 MS
R AXIS: 76 DEGREES
T AXIS: 75 DEGREES
T OFFSET: 424 MS
VENTRICULAR RATE: 70 BPM

## 2025-04-08 ENCOUNTER — APPOINTMENT (OUTPATIENT)
Dept: DERMATOLOGY | Facility: CLINIC | Age: 80
End: 2025-04-08
Payer: MEDICARE

## 2025-04-08 DIAGNOSIS — Z41.9 ELECTIVE SURGERY FOR PURPOSES OTHER THAN TREATING HEALTH CONDITIONS: Primary | ICD-10-CM

## 2025-04-08 PROCEDURE — BOTOX BOTOX 1 UNIT: Performed by: DERMATOLOGY

## 2025-04-08 PROCEDURE — 1159F MED LIST DOCD IN RCRD: CPT | Performed by: DERMATOLOGY

## 2025-04-08 PROCEDURE — PBTXA ADMINISTRATION FEE COSMETIC: Performed by: DERMATOLOGY

## 2025-04-08 PROCEDURE — 1123F ACP DISCUSS/DSCN MKR DOCD: CPT | Performed by: DERMATOLOGY

## 2025-04-08 PROCEDURE — 1157F ADVNC CARE PLAN IN RCRD: CPT | Performed by: DERMATOLOGY

## 2025-04-08 PROCEDURE — 1036F TOBACCO NON-USER: CPT | Performed by: DERMATOLOGY

## 2025-04-08 ASSESSMENT — DERMATOLOGY QUALITY OF LIFE (QOL) ASSESSMENT
WHAT SINGLE SKIN CONDITION LISTED BELOW IS THE PATIENT ANSWERING THE QUALITY-OF-LIFE ASSESSMENT QUESTIONS ABOUT: NONE OF THE ABOVE
RATE HOW BOTHERED YOU ARE BY EFFECTS OF YOUR SKIN PROBLEMS ON YOUR ACTIVITIES (EG, GOING OUT, ACCOMPLISHING WHAT YOU WANT, WORK ACTIVITIES OR YOUR RELATIONSHIPS WITH OTHERS): 0 - NEVER BOTHERED
ARE THERE EXCLUSIONS OR EXCEPTIONS FOR THE QUALITY OF LIFE ASSESSMENT: NO
RATE HOW EMOTIONALLY BOTHERED YOU ARE BY YOUR SKIN PROBLEM (FOR EXAMPLE, WORRY, EMBARRASSMENT, FRUSTRATION): 0 - NEVER BOTHERED
RATE HOW BOTHERED YOU ARE BY SYMPTOMS OF YOUR SKIN PROBLEM (EG, ITCHING, STINGING BURNING, HURTING OR SKIN IRRITATION): 0 - NEVER BOTHERED

## 2025-04-08 ASSESSMENT — DERMATOLOGY PATIENT ASSESSMENT
DO YOU USE A TANNING BED: NO
ARE YOU ON BIRTH CONTROL: NO
FOR PATIENTS COMING IN FOR A FOLLOW-UP VISIT - HAVE THERE BEEN ANY CHANGES IN YOUR HEALTH SINCE YOUR LAST VISIT: ALL IN MYCHART
DO YOU HAVE ANY NEW OR CHANGING LESIONS: NO
DO YOU HAVE IRREGULAR MENSTRUAL CYCLES: NO
DO YOU USE SUNSCREEN: OCCASIONALLY
ARE YOU AN ORGAN TRANSPLANT RECIPIENT: NO
ARE YOU TRYING TO GET PREGNANT: NO

## 2025-04-08 ASSESSMENT — PATIENT GLOBAL ASSESSMENT (PGA): PATIENT GLOBAL ASSESSMENT: PATIENT GLOBAL ASSESSMENT:  1 - CLEAR

## 2025-04-08 ASSESSMENT — ITCH NUMERIC RATING SCALE: HOW SEVERE IS YOUR ITCHING?: 0

## 2025-04-08 NOTE — PROGRESS NOTES
Subjective     Zoraida Mejia is a 79 y.o. female who presents for the following: Cosmetic (Pt here today for cosmetic treatment of wrinkles with Botox. Last visit total of 51 units injected. Glabella-25(8,7,7,2,2) Periorbital-16(8 each side), Perioral-4, Forehead 6(Right 4, Left 2). Pt was pleased with results from last visit. ).     Review of Systems:  No other skin or systemic complaints other than what is documented elsewhere in the note.    The following portions of the chart were reviewed this encounter and updated as appropriate:          Skin Cancer History  No skin cancer on file.      Specialty Problems          Dermatology Problems    Facial rhytids    Multiple nevi    Melanocytic nevi of other parts of face    Melanocytic nevi of right lower limb, including hip    Melanocytic nevi of trunk    Other melanin hyperpigmentation    Other seborrheic keratosis    Scar condition and fibrosis of skin    Hemangioma of skin    Lentiginosis        Objective   Well appearing patient in no apparent distress; mood and affect are within normal limits.    A focused skin examination was performed of the face. All findings within normal limits unless otherwise noted below.    Assessment/Plan   1. Elective surgery for purposes other than treating health conditions  Glabella  Dynamic and static rhytids    Botox is a neurotoxin which prevents muscles from whitley that can help soften and smooth fine lines/wrinkles.  Risks and benefits were discussed bruising and swelling, lid droop, dropping of eyebrow, asymmetrical smile. For continued improvement ongoing treatment with Botox will soften lines  Botox typically begins to work 3 days after injection, full effect by 14 days. Typically lasts about 3 months. Continued treatments will continue to help lines to soften over time. However, lines may not completely disappear.  After treatment do not lie flat for 4 hours and do not exercise for 24 hours after the  procedure.    Chemodenervation - Glabella    Date/Time: 4/8/2025 8:37 AM    Performed by: Susy Gracia DO  Authorized by: Susy Gracia DO      Cosmetic:  yesnot medical botulinum toxin injection    Consent:      Consent obtained:  Written     Consent given by:  Patient     Risks discussed:  Poor cosmetic result     Alternatives discussed:  No treatment    Universal protocol:      Relevant documents present and verified:  Yes       Site/side verified:  Yes       Patient identity confirmed:  Verbally with patient    Pre-procedure details:   Prep type:  Isopropyl alcohol    Procedure details:      Diluted by:  Preservative free saline     Toxin (Brand):  OnaBoNT-A (Botox)     Concentration (u/mL):  2U/0.1mL    Total units injected:  41 U    Post-procedure details:      Patient tolerance of procedure:  Tolerated well, no immediate complications    Comments: Units injected: 41U  Glabella: 20 (6,5,5,2,2)  Perioral: 4  Forehead: 6 - (4Right side, 2 left side)  Periorbital: 11 - (6 Right, 5 left)  Lot: [id number]  Exp: 3/2027      Staff Communication: Dermatology Medication: Other - Drug: Botox Strength: 2U/0.1mL    Amount: 41 U - Glabella    We did briefly discuss volbella for lip lines, however patient had a lot of swelling with some form a filler previously and elected to defer at this time.     Follow up for next treatment in 3-4 months.

## 2025-04-29 ENCOUNTER — HOSPITAL ENCOUNTER (OUTPATIENT)
Dept: RADIOLOGY | Facility: CLINIC | Age: 80
Discharge: HOME | End: 2025-04-29
Payer: MEDICARE

## 2025-04-29 DIAGNOSIS — Z13.6 SCREENING FOR HEART DISEASE: ICD-10-CM

## 2025-04-29 DIAGNOSIS — E78.5 HYPERLIPIDEMIA, UNSPECIFIED HYPERLIPIDEMIA TYPE: ICD-10-CM

## 2025-04-29 DIAGNOSIS — I70.0 AORTIC CALCIFICATION: Primary | ICD-10-CM

## 2025-04-29 PROCEDURE — 75571 CT HRT W/O DYE W/CA TEST: CPT

## 2025-05-02 ENCOUNTER — OFFICE VISIT (OUTPATIENT)
Dept: DERMATOLOGY | Facility: CLINIC | Age: 80
End: 2025-05-02
Payer: MEDICARE

## 2025-05-02 DIAGNOSIS — L21.9 SEBORRHEIC DERMATITIS: Primary | ICD-10-CM

## 2025-05-02 PROCEDURE — 99213 OFFICE O/P EST LOW 20 MIN: CPT | Performed by: DERMATOLOGY

## 2025-05-02 PROCEDURE — 1157F ADVNC CARE PLAN IN RCRD: CPT | Performed by: DERMATOLOGY

## 2025-05-02 PROCEDURE — 1123F ACP DISCUSS/DSCN MKR DOCD: CPT | Performed by: DERMATOLOGY

## 2025-05-02 RX ORDER — HYDROCORTISONE 25 MG/G
CREAM TOPICAL
Qty: 28 G | Refills: 1 | Status: SHIPPED | OUTPATIENT
Start: 2025-05-02

## 2025-05-02 RX ORDER — KETOCONAZOLE 20 MG/G
CREAM TOPICAL
Qty: 30 G | Refills: 2 | Status: SHIPPED | OUTPATIENT
Start: 2025-05-02

## 2025-05-02 NOTE — PROGRESS NOTES
Subjective     Zoraida Mejia is a 80 y.o. female who presents for the following: Dry skin (Pt here today for dry skin patches on face. Unsure what it could be.). Reports nearly always has dryness in creases but within the past few weeks increased scale and spreading on the left side of the nose          Review of Systems:  No other skin or systemic complaints other than what is documented elsewhere in the note.    The following portions of the chart were reviewed this encounter and updated as appropriate:          Skin Cancer History  Biopsy Log Book  No skin cancers from Specimen Tracking.    Additional History      Specialty Problems          Dermatology Problems    Facial rhytids    Multiple nevi    Melanocytic nevi of other parts of face    Melanocytic nevi of right lower limb, including hip    Melanocytic nevi of trunk    Other melanin hyperpigmentation    Other seborrheic keratosis    Scar condition and fibrosis of skin    Hemangioma of skin    Lentiginosis        Objective   Well appearing patient in no apparent distress; mood and affect are within normal limits.    A focused skin examination was performed of the face. All findings within normal limits unless otherwise noted below.    Assessment/Plan   Skin Exam  1. SEBORRHEIC DERMATITIS  Left Alar Crease, Right Alar Crease  Erythema with overlying greasy scale Left > Right  The chronic and intermittently flaring nature of this skin condition was discussed with patient today.   This is due to a yeast that everyone has on their skin that results in redness, dryness and in some patients itching.    Various treatment options were reviewed including topical antifungals and topical steroids depending upon severity and symptoms. Patient advised we cannot cure this condition, but it can be controlled.     Begin the following treatment:  - hydrocortisone 2.5% cream 2x daily x 2 weeks for current flare  - ketoconazole 2% cream 2x daily for maintenance to prevent  recurrence    Follow up if not improving after 2 weeks  Related Medications  hydrocortisone 2.5 % cream  Apply 2x daily x 2 weeks when flaring  ketoconazole (NIZOral) 2 % cream  Apply as a maintenance 2x daily to affected areas on the face

## 2025-05-02 NOTE — Clinical Note
The chronic and intermittently flaring nature of this skin condition was discussed with patient today.   This is due to a yeast that everyone has on their skin that results in redness, dryness and in some patients itching.    Various treatment options were reviewed including topical antifungals and topical steroids depending upon severity and symptoms. Patient advised we cannot cure this condition, but it can be controlled.     Begin the following treatment:  - hydrocortisone 2.5% cream 2x daily x 2 weeks for current flare  - ketoconazole 2% cream 2x daily for maintenance to prevent recurrence    Follow up if not improving after 2 weeks

## 2025-06-02 DIAGNOSIS — M80.00XD OSTEOPOROSIS WITH CURRENT PATHOLOGICAL FRACTURE WITH ROUTINE HEALING, UNSPECIFIED OSTEOPOROSIS TYPE, SUBSEQUENT ENCOUNTER: ICD-10-CM

## 2025-06-02 DIAGNOSIS — M80.00XD OSTEOPOROSIS WITH CURRENT PATHOLOGICAL FRACTURE WITH ROUTINE HEALING, UNSPECIFIED OSTEOPOROSIS TYPE, SUBSEQUENT ENCOUNTER: Primary | ICD-10-CM

## 2025-06-02 RX ORDER — ACETAMINOPHEN 325 MG/1
650 TABLET ORAL ONCE
Status: CANCELLED | OUTPATIENT
Start: 2025-06-02

## 2025-06-02 RX ORDER — DIPHENHYDRAMINE HYDROCHLORIDE 50 MG/ML
50 INJECTION, SOLUTION INTRAMUSCULAR; INTRAVENOUS AS NEEDED
Status: CANCELLED | OUTPATIENT
Start: 2025-06-02

## 2025-06-02 RX ORDER — FAMOTIDINE 10 MG/ML
20 INJECTION, SOLUTION INTRAVENOUS ONCE AS NEEDED
Status: CANCELLED | OUTPATIENT
Start: 2025-06-02

## 2025-06-02 RX ORDER — ZOLEDRONIC ACID 5 MG/100ML
5 INJECTION, SOLUTION INTRAVENOUS ONCE
Status: CANCELLED | OUTPATIENT
Start: 2025-06-06

## 2025-06-02 RX ORDER — EPINEPHRINE 0.3 MG/.3ML
0.3 INJECTION SUBCUTANEOUS EVERY 5 MIN PRN
Status: CANCELLED | OUTPATIENT
Start: 2025-06-02

## 2025-06-02 RX ORDER — ALBUTEROL SULFATE 0.83 MG/ML
3 SOLUTION RESPIRATORY (INHALATION) AS NEEDED
Status: CANCELLED | OUTPATIENT
Start: 2025-06-02

## 2025-06-03 ENCOUNTER — APPOINTMENT (OUTPATIENT)
Dept: PRIMARY CARE | Facility: CLINIC | Age: 80
End: 2025-06-03
Payer: MEDICARE

## 2025-06-03 ENCOUNTER — LAB (OUTPATIENT)
Dept: LAB | Facility: HOSPITAL | Age: 80
End: 2025-06-03
Payer: MEDICARE

## 2025-06-03 VITALS
DIASTOLIC BLOOD PRESSURE: 80 MMHG | OXYGEN SATURATION: 97 % | SYSTOLIC BLOOD PRESSURE: 138 MMHG | HEART RATE: 62 BPM | BODY MASS INDEX: 22.27 KG/M2 | WEIGHT: 120.8 LBS | TEMPERATURE: 96.7 F

## 2025-06-03 DIAGNOSIS — M80.00XD OSTEOPOROSIS WITH CURRENT PATHOLOGICAL FRACTURE WITH ROUTINE HEALING, UNSPECIFIED OSTEOPOROSIS TYPE, SUBSEQUENT ENCOUNTER: ICD-10-CM

## 2025-06-03 DIAGNOSIS — E03.9 ACQUIRED HYPOTHYROIDISM: ICD-10-CM

## 2025-06-03 DIAGNOSIS — M80.00XD AGE-RELATED OSTEOPOROSIS WITH CURRENT PATHOLOGICAL FRACTURE, UNSPECIFIED SITE, SUBSEQUENT ENCOUNTER FOR FRACTURE WITH ROUTINE HEALING: Primary | ICD-10-CM

## 2025-06-03 DIAGNOSIS — E78.5 HYPERLIPIDEMIA, UNSPECIFIED HYPERLIPIDEMIA TYPE: ICD-10-CM

## 2025-06-03 DIAGNOSIS — F33.0 MILD EPISODE OF RECURRENT MAJOR DEPRESSIVE DISORDER: ICD-10-CM

## 2025-06-03 DIAGNOSIS — I10 PRIMARY HYPERTENSION: Primary | ICD-10-CM

## 2025-06-03 LAB
ALBUMIN SERPL BCP-MCNC: 4.5 G/DL (ref 3.4–5)
ALP SERPL-CCNC: 73 U/L (ref 33–136)
ALT SERPL W P-5'-P-CCNC: 11 U/L (ref 7–45)
ANION GAP SERPL CALC-SCNC: 12 MMOL/L (ref 10–20)
AST SERPL W P-5'-P-CCNC: 17 U/L (ref 9–39)
BILIRUB SERPL-MCNC: 0.6 MG/DL (ref 0–1.2)
BUN SERPL-MCNC: 11 MG/DL (ref 6–23)
CALCIUM SERPL-MCNC: 8.9 MG/DL (ref 8.6–10.6)
CHLORIDE SERPL-SCNC: 102 MMOL/L (ref 98–107)
CO2 SERPL-SCNC: 29 MMOL/L (ref 21–32)
CREAT SERPL-MCNC: 0.83 MG/DL (ref 0.5–1.05)
EGFRCR SERPLBLD CKD-EPI 2021: 71 ML/MIN/1.73M*2
GLUCOSE SERPL-MCNC: 96 MG/DL (ref 74–99)
POTASSIUM SERPL-SCNC: 4.3 MMOL/L (ref 3.5–5.3)
PROT SERPL-MCNC: 7.3 G/DL (ref 6.4–8.2)
SODIUM SERPL-SCNC: 139 MMOL/L (ref 136–145)

## 2025-06-03 PROCEDURE — 3078F DIAST BP <80 MM HG: CPT | Performed by: INTERNAL MEDICINE

## 2025-06-03 PROCEDURE — G2211 COMPLEX E/M VISIT ADD ON: HCPCS | Performed by: INTERNAL MEDICINE

## 2025-06-03 PROCEDURE — 1160F RVW MEDS BY RX/DR IN RCRD: CPT | Performed by: INTERNAL MEDICINE

## 2025-06-03 PROCEDURE — 99214 OFFICE O/P EST MOD 30 MIN: CPT | Performed by: INTERNAL MEDICINE

## 2025-06-03 PROCEDURE — 3075F SYST BP GE 130 - 139MM HG: CPT | Performed by: INTERNAL MEDICINE

## 2025-06-03 PROCEDURE — 80053 COMPREHEN METABOLIC PANEL: CPT

## 2025-06-03 PROCEDURE — 1159F MED LIST DOCD IN RCRD: CPT | Performed by: INTERNAL MEDICINE

## 2025-06-03 PROCEDURE — 36415 COLL VENOUS BLD VENIPUNCTURE: CPT

## 2025-06-03 RX ORDER — SERTRALINE HYDROCHLORIDE 25 MG/1
25 TABLET, FILM COATED ORAL DAILY
Qty: 30 TABLET | Refills: 1 | Status: SHIPPED | OUTPATIENT
Start: 2025-06-03 | End: 2025-08-02

## 2025-06-03 ASSESSMENT — PATIENT HEALTH QUESTIONNAIRE - PHQ9
7. TROUBLE CONCENTRATING ON THINGS, SUCH AS READING THE NEWSPAPER OR WATCHING TELEVISION: SEVERAL DAYS
4. FEELING TIRED OR HAVING LITTLE ENERGY: SEVERAL DAYS
9. THOUGHTS THAT YOU WOULD BE BETTER OFF DEAD, OR OF HURTING YOURSELF: NOT AT ALL
8. MOVING OR SPEAKING SO SLOWLY THAT OTHER PEOPLE COULD HAVE NOTICED. OR THE OPPOSITE, BEING SO FIGETY OR RESTLESS THAT YOU HAVE BEEN MOVING AROUND A LOT MORE THAN USUAL: NOT AT ALL
SUM OF ALL RESPONSES TO PHQ9 QUESTIONS 1 AND 2: 2
2. FEELING DOWN, DEPRESSED OR HOPELESS: SEVERAL DAYS
5. POOR APPETITE OR OVEREATING: SEVERAL DAYS
SUM OF ALL RESPONSES TO PHQ QUESTIONS 1-9: 6
3. TROUBLE FALLING OR STAYING ASLEEP OR SLEEPING TOO MUCH: SEVERAL DAYS
6. FEELING BAD ABOUT YOURSELF - OR THAT YOU ARE A FAILURE OR HAVE LET YOURSELF OR YOUR FAMILY DOWN: NOT AT ALL
1. LITTLE INTEREST OR PLEASURE IN DOING THINGS: SEVERAL DAYS

## 2025-06-03 ASSESSMENT — ENCOUNTER SYMPTOMS: DEPRESSION: 1

## 2025-06-03 NOTE — PROGRESS NOTES
"Subjective   Patient ID: Zoraida Mejia is a 80 y.o. female who presents for Follow-up (Patient is here for a follow up.) and Depression (Patient states that she was on Zoloft in the past and would like to discuss taking this again due to seasonal depression. ).    Here  to follow up on medical problems HTN,HLD,Hypothyroid,tubular adenoma,GERD,h/o ovarian cyst,h/o kidney stone ,anxiety,osteoporosis with vertebral Fx.  Here to discuss all of her test result with me, she also complained of some depressed mood and lack of motivation.  She sees Dr Farmer , cardiologist denies chest pain.  Her cough resolved after stopping Lisinopril,currently on Losartan, home BP at goal except when anxious BP can go up at home.  she has ,HLD, depression,anxiety, hypothyroid,osteoporosis,.taking her meds regularly,no side effects.She also has osteoporosis on IV Reclast and was covered, I put her order for this month.  She had history of multilevel degenerative disc disease lumbar spine and L2 vertebral compression fracture , no recurrence of her pain.  No urinary symptoms or incontinence.no legs weakness.  She refused mammogram(has breast implant,\"had bad experience when she had her mammogram in past\"  Last DEXA 11/2022,on IV Reclast for 2 years,6/2024  NO MAMMOGRAM ON FILE, patient refused  COLONOSCOPY: 2/22/2024   BONE DENSITY: 11/4/2024 ,on IV Reclast, the patient will reschedule her appointment until after she is done with her dental work.  Cardiac calcium score 4/29/2025 score was 0,  She had eyelid surgery for ptosis early 1/2024         Review of Systems   Constitutional: Negative.    HENT: Negative.     Eyes: Negative.    Respiratory: Negative.     Cardiovascular: Negative.    Gastrointestinal: Negative.    Psychiatric/Behavioral:  Positive for depression.        Objective   /80 (BP Location: Left arm, Patient Position: Sitting)   Pulse 62   Temp 35.9 °C (96.7 °F) (Temporal)   Wt 54.8 kg (120 lb 12.8 oz)   SpO2 97% "   BMI 22.27 kg/m²     Physical Exam  Constitutional:       Appearance: Normal appearance.   HENT:      Head: Normocephalic and atraumatic.   Eyes:      Extraocular Movements: Extraocular movements intact.      Pupils: Pupils are equal, round, and reactive to light.   Cardiovascular:      Rate and Rhythm: Normal rate and regular rhythm.      Heart sounds: Normal heart sounds.   Pulmonary:      Effort: Pulmonary effort is normal.      Breath sounds: Normal breath sounds. No wheezing or rhonchi.   Abdominal:      General: Abdomen is flat. Bowel sounds are normal. There is no distension.      Palpations: Abdomen is soft.   Musculoskeletal:         General: Normal range of motion.      Cervical back: Normal range of motion and neck supple.      Right lower leg: No edema.      Left lower leg: No edema.   Skin:     General: Skin is warm.   Neurological:      General: No focal deficit present.      Mental Status: She is alert and oriented to person, place, and time.   Psychiatric:         Mood and Affect: Mood normal.         Behavior: Behavior normal.         Assessment/Plan   Problem List Items Addressed This Visit           ICD-10-CM    Hyperlipidemia E78.5    Stable on Crestor.               Hypothyroidism E03.9    Stable on levothyroxine.               Osteoporosis M81.0      IV Reclast (zoledronic acid), is 5 mg intravenous infusion given once a year for Osteoporosis and once every two years for Osteopenia. This is a 15 to 20 minute infusion given at our infusion center.  For that infusion, it would be important to have completed any necessary dental work and continue following with your dentist every 6 months and the recommended lab work we ordered. You would need to be well hydrated to insure good kidney function. Please insure adequate hydration with water, about 6 to 8 cups of water, the day before, the day of the infusion and the day after. Please avoid dehydration in general.   Some people experience flu like  symptoms and low grade fever, after the infusion. This is usually self limited and will resolve. The recommended treatment for symptom relief is taking acetaminophen/Tylenol two extra strength tablets (500mg each) every 6 hours until this has resolved, usually does not last more than 3 days.  IV Reclast once a year is usually limited to no more than 3 yrs, to prevent increased risk for atypical fracture of femur.  Bisphosphonates cannot be prescribed to patients who have very low kidney function and those with untreated dental problems.  Reviewed importance of regular dental care, follow up with dentist and good oral hygiene  We also reviewed risk of reported ONJ (osteonecrosis of the jaw) and atypical fracture of femur.          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  Follow-up in 4 weeks.  PHQ-9 was done today.         Relevant Medications    sertraline (Zoloft) 25 mg tablet

## 2025-06-03 NOTE — ASSESSMENT & PLAN NOTE
IV Reclast (zoledronic acid), is 5 mg intravenous infusion given once a year for Osteoporosis and once every two years for Osteopenia. This is a 15 to 20 minute infusion given at our infusion center.  For that infusion, it would be important to have completed any necessary dental work and continue following with your dentist every 6 months and the recommended lab work we ordered. You would need to be well hydrated to insure good kidney function. Please insure adequate hydration with water, about 6 to 8 cups of water, the day before, the day of the infusion and the day after. Please avoid dehydration in general.   Some people experience flu like symptoms and low grade fever, after the infusion. This is usually self limited and will resolve. The recommended treatment for symptom relief is taking acetaminophen/Tylenol two extra strength tablets (500mg each) every 6 hours until this has resolved, usually does not last more than 3 days.  IV Reclast once a year is usually limited to no more than 3 yrs, to prevent increased risk for atypical fracture of femur.  Bisphosphonates cannot be prescribed to patients who have very low kidney function and those with untreated dental problems.  Reviewed importance of regular dental care, follow up with dentist and good oral hygiene  We also reviewed risk of reported ONJ (osteonecrosis of the jaw) and atypical fracture of femur.

## 2025-06-05 ASSESSMENT — ENCOUNTER SYMPTOMS
CARDIOVASCULAR NEGATIVE: 1
GASTROINTESTINAL NEGATIVE: 1
CONSTITUTIONAL NEGATIVE: 1
RESPIRATORY NEGATIVE: 1
EYES NEGATIVE: 1

## 2025-06-05 NOTE — ASSESSMENT & PLAN NOTE
Will start sertraline according to patient she did benefit from it in the past and had no side effect.  Follow-up in 4 weeks.  PHQ-9 was done today.

## 2025-06-06 ENCOUNTER — APPOINTMENT (OUTPATIENT)
Dept: INFUSION THERAPY | Facility: CLINIC | Age: 80
End: 2025-06-06
Payer: MEDICARE

## 2025-06-06 VITALS
TEMPERATURE: 97.2 F | OXYGEN SATURATION: 98 % | SYSTOLIC BLOOD PRESSURE: 123 MMHG | DIASTOLIC BLOOD PRESSURE: 67 MMHG | RESPIRATION RATE: 16 BRPM | HEART RATE: 68 BPM

## 2025-06-06 DIAGNOSIS — M80.00XD OSTEOPOROSIS WITH CURRENT PATHOLOGICAL FRACTURE WITH ROUTINE HEALING, UNSPECIFIED OSTEOPOROSIS TYPE, SUBSEQUENT ENCOUNTER: ICD-10-CM

## 2025-06-06 RX ORDER — FAMOTIDINE 10 MG/ML
20 INJECTION, SOLUTION INTRAVENOUS ONCE AS NEEDED
OUTPATIENT
Start: 2025-06-06

## 2025-06-06 RX ORDER — ALBUTEROL SULFATE 0.83 MG/ML
3 SOLUTION RESPIRATORY (INHALATION) AS NEEDED
OUTPATIENT
Start: 2025-06-06

## 2025-06-06 RX ORDER — DIPHENHYDRAMINE HYDROCHLORIDE 50 MG/ML
50 INJECTION, SOLUTION INTRAMUSCULAR; INTRAVENOUS AS NEEDED
OUTPATIENT
Start: 2025-06-06

## 2025-06-06 RX ORDER — ZOLEDRONIC ACID 5 MG/100ML
5 INJECTION, SOLUTION INTRAVENOUS ONCE
Status: COMPLETED | OUTPATIENT
Start: 2025-06-06 | End: 2025-06-06

## 2025-06-06 RX ORDER — EPINEPHRINE 0.3 MG/.3ML
0.3 INJECTION SUBCUTANEOUS EVERY 5 MIN PRN
OUTPATIENT
Start: 2025-06-06

## 2025-06-06 RX ORDER — ZOLEDRONIC ACID 5 MG/100ML
5 INJECTION, SOLUTION INTRAVENOUS ONCE
Status: CANCELLED | OUTPATIENT
Start: 2025-06-06

## 2025-06-06 RX ORDER — ACETAMINOPHEN 325 MG/1
650 TABLET ORAL ONCE
Status: COMPLETED | OUTPATIENT
Start: 2025-06-06 | End: 2025-06-06

## 2025-06-06 RX ORDER — ACETAMINOPHEN 325 MG/1
650 TABLET ORAL ONCE
Status: CANCELLED | OUTPATIENT
Start: 2025-06-06

## 2025-06-06 RX ADMIN — ZOLEDRONIC ACID 5 MG: 5 INJECTION, SOLUTION INTRAVENOUS at 08:06

## 2025-06-06 RX ADMIN — ACETAMINOPHEN 650 MG: 325 TABLET ORAL at 07:54

## 2025-06-06 ASSESSMENT — ENCOUNTER SYMPTOMS
UNEXPECTED WEIGHT CHANGE: 0
BLOOD IN STOOL: 0
WOUND: 0
FREQUENCY: 0
VOICE CHANGE: 0
EXTREMITY WEAKNESS: 0
COUGH: 0
PALPITATIONS: 0
HEADACHES: 0
FATIGUE: 0
SORE THROAT: 0
APPETITE CHANGE: 0
NUMBNESS: 0
SHORTNESS OF BREATH: 0
ABDOMINAL PAIN: 0
DYSURIA: 0
CONSTIPATION: 0
LIGHT-HEADEDNESS: 0
ARTHRALGIAS: 0
WHEEZING: 0
LEG SWELLING: 0
NAUSEA: 0
MYALGIAS: 0
DIARRHEA: 0
TROUBLE SWALLOWING: 0
BRUISES/BLEEDS EASILY: 0
EYE PROBLEMS: 0
HEMATURIA: 0
DIZZINESS: 0
CHILLS: 0
FEVER: 0
VOMITING: 0

## 2025-06-06 NOTE — PATIENT INSTRUCTIONS
Today :We administered acetaminophen and zoledronic acid.     For:   1. Osteoporosis with current pathological fracture with routine healing, unspecified osteoporosis type, subsequent encounter         Your next appointment is due in:  1 year        Please read the  Medication Guide that was given to you and reviewed during todays visit.     (Tell all doctors including dentists that you are taking this medication)     Go to the emergency room or call 911 if:  -You have signs of allergic reaction:   -Rash, hives, itching.   -Swollen, blistered, peeling skin.   -Swelling of face, lips, mouth, tongue or throat.   -Tightness of chest, trouble breathing, swallowing or talking     Call your doctor:  - If IV / injection site gets red, warm, swollen, itchy or leaks fluid or pus.     (Leave dressing on your IV site for at least 2 hours and keep area clean and dry  - If you get sick or have symptoms of infection or are not feeling well for any reason.    (Wash your hands often, stay away from people who are sick)  - If you have side effects from your medication that do not go away or are bothersome.     (Refer to the teaching your nurse gave you for side effects to call your doctor about)    - Common side effects may include:  stuffy nose, headache, feeling tired, muscle aches, upset stomach  - Before receiving any vaccines     - Call the [facility] at   If:  - You get sick, are on antibiotics, have had a recent vaccine, have surgery or dental work and your doctor wants your visit rescheduled.  - You need to cancel and reschedule your visit for any reason. Call at least 2 days before your visit if you need to cancel.   - Your insurance changes before your next visit.    (We will need to get approval from your new insurance. This can take up to two weeks.)     The [facility] is opened Monday thru Friday. We are closed on weekends and holidays.   Voice mail will take your call if the center is  closed. If you leave a message please allow 24 hours for a call back during weekdays. If you leave a message on a weekend/holiday, we will call you back the next business day.    A pharmacist is available Monday - Friday from 8:30AM to 3:30PM to help answer any questions you may have about your prescriptions(s). Please call pharmacy at:    [facility]: [phone]  [facility]: [phone]  [facility]: [phone]

## 2025-06-06 NOTE — PROGRESS NOTES
[facility]   Infusion Clinic Note   Date: 2025   Name: Zoraida Mejia  : 1945   MRN: [id number]         Reason for Visit: OP Infusion (5 mg rreclast infusion)         Today: Zoraida Mejia had no medications administered during this visit.       Ordered By: Dang Karimi MD       For a Diagnosis of: Osteoporosis with current pathological fracture with routine healing, unspecified osteoporosis type, subsequent encounter       At today's visit patient accompanied by: Self      Today's Vitals:   There were no vitals filed for this visit.          Pre - Treatment Checklist:      - Previous reaction to current treatment: no      (Assess patient for the concerns below. Document provider notification as appropriate).  - Active or recent infection with/without current antibiotic use: no  - Recent or planned invasive dental work: no  - Recent or planned surgeries: no  - Recently received or plans to receive vaccinations: no  - Has treatment related toxicities: no  - Any chance may be pregnant:  no      Pain: 2   - Is the pain different from normal: no   - Is prescribing Doctor aware:  n/a      Labs: Reviewed       Fall Risk Screening:         Review Of Systems:  Review of Systems   Constitutional:  Negative for appetite change, chills, fatigue, fever and unexpected weight change.   HENT:   Negative for hearing loss, mouth sores, sore throat, tinnitus, trouble swallowing and voice change.    Eyes:  Negative for eye problems.   Respiratory:  Negative for cough, shortness of breath and wheezing.    Cardiovascular:  Negative for chest pain, leg swelling and palpitations.   Gastrointestinal:  Negative for abdominal pain, blood in stool, constipation, diarrhea, nausea and vomiting.   Genitourinary:  Negative for dysuria, frequency and hematuria.    Musculoskeletal:  Negative for arthralgias and myalgias.   Skin:  Negative for itching, rash and wound.   Neurological:  Negative for  "dizziness, extremity weakness, headaches, light-headedness and numbness.   Hematological:  Does not bruise/bleed easily.         Infusion Readiness:  - Assessment Concerns Related to Infusion: No  - Provider notified: n/a      New Patient Education:    N/A (returning patient for continuation of therapy. Ongoing education provided as needed.)        Treatment Conditions & Drug Specific Questions:    Zoledronic Acid  (RECLAST)    (Unless otherwise specified on patient specific therapy plan):     TREATMENT CONDITIONS:  Unless otherwise specified on patient specific therapy plan HOLD and notify provider prior to proceeding with treatment if:   o Creatinine clearance LESS THAN 35 mL/Minute  o Corrected or Serum Calcium LESS THAN 8.6 mg/dL  OR Ionized calcium less than 1.1 mmol/L or  less than 4.7 mg/dL (depending on resulting agency)  o Recent (within 4 weeks) or planned invasive dental procedure.  -           Positive Pregnancy     Lab Results   Component Value Date    CREATININE 0.83 06/03/2025      Lab Results   Component Value Date    CALCIUM 8.9 06/03/2025    PHOS 3.3 12/27/2022      No results found for: \"CAION\"    CrCl: 46.69  Corrected calcium: 8.5  ok per Anna Jhonson CNP    Patient meets treatment conditions? Yes    DRUG SPECIFIC QUESTIONS:  Is the patient taking a Calcium and Vitamin D supplement?  Yes  (Recommended)    Is the patient receiving Zometa or do they have an allergy to Zometa?  No    Is the patient aware of the adverse effects which may include bone fractures, hypocalcemia, influenza-like illness, musculoskeletal pain, ocular inflammation, and osteonecrosis of the jaw? Yes      REMINDERS:  PREGNANCY CATEGORY X DRUG. OBTAIN NEGATIVE PREGNANCY TEST PRIOR TO FIRST INFUSION FOR WOMEN OF CHILDBEARING ABILITY     Recommended Vitals/Observation:  Monitor vital signs before infusion, at the end of the infusion and as needed        Weight Based Drug Calculations:    WEIGHT BASED DRUGS: NOT APPLICABLE / " FLAT DOSE       Post Treatment: Patient tolerated treatment without issue and was discharged in no apparent distress.      Note Authored / Patient Cared for By: Danae Benz RN

## 2025-06-20 ENCOUNTER — TELEPHONE (OUTPATIENT)
Dept: DERMATOLOGY | Facility: CLINIC | Age: 80
End: 2025-06-20
Payer: MEDICARE

## 2025-06-20 ENCOUNTER — HOSPITAL ENCOUNTER (OUTPATIENT)
Dept: CARDIOLOGY | Facility: CLINIC | Age: 80
Discharge: HOME | End: 2025-06-20
Payer: MEDICARE

## 2025-06-20 DIAGNOSIS — I10 PRIMARY HYPERTENSION: ICD-10-CM

## 2025-06-20 DIAGNOSIS — I70.0 AORTIC CALCIFICATION: Primary | ICD-10-CM

## 2025-06-20 DIAGNOSIS — E78.5 HYPERLIPIDEMIA, UNSPECIFIED HYPERLIPIDEMIA TYPE: ICD-10-CM

## 2025-06-20 DIAGNOSIS — I70.0 AORTIC CALCIFICATION: ICD-10-CM

## 2025-06-20 DIAGNOSIS — I35.9 NONRHEUMATIC AORTIC VALVE DISORDER, UNSPECIFIED: ICD-10-CM

## 2025-06-20 LAB
AORTIC VALVE MEAN GRADIENT: 5 MMHG
AORTIC VALVE PEAK VELOCITY: 1.69 M/S
AV PEAK GRADIENT: 11 MMHG
AVA (PEAK VEL): 1.2 CM2
AVA (VTI): 1.09 CM2
EJECTION FRACTION APICAL 4 CHAMBER: 60.2
EJECTION FRACTION: 58 %
LEFT ATRIUM VOLUME AREA LENGTH INDEX BSA: 24.7 ML/M2
LEFT VENTRICLE INTERNAL DIMENSION DIASTOLE: 4 CM (ref 3.5–6)
LEFT VENTRICULAR OUTFLOW TRACT DIAMETER: 1.6 CM
MITRAL VALVE E/A RATIO: 0.76
RIGHT VENTRICLE FREE WALL PEAK S': 10 CM/S
RIGHT VENTRICLE PEAK SYSTOLIC PRESSURE: 25 MMHG
TRICUSPID ANNULAR PLANE SYSTOLIC EXCURSION: 2 CM

## 2025-06-20 PROCEDURE — C8929 TTE W OR WO FOL WCON,DOPPLER: HCPCS

## 2025-06-20 PROCEDURE — 2500000004 HC RX 250 GENERAL PHARMACY W/ HCPCS (ALT 636 FOR OP/ED): Performed by: INTERNAL MEDICINE

## 2025-06-20 PROCEDURE — 93306 TTE W/DOPPLER COMPLETE: CPT | Performed by: INTERNAL MEDICINE

## 2025-06-20 RX ADMIN — PERFLUTREN 3 ML OF DILUTION: 6.52 INJECTION, SUSPENSION INTRAVENOUS at 08:50

## 2025-06-20 NOTE — TELEPHONE ENCOUNTER
Hello, this patient walked into the office stating the medication she was prescribed for Seborrheic Dermatitis is not working and she would like to know if there's something else she might be able to try. Please give her a call at [phone]. Thank you!

## 2025-06-27 DIAGNOSIS — F33.0 MILD EPISODE OF RECURRENT MAJOR DEPRESSIVE DISORDER: ICD-10-CM

## 2025-06-27 RX ORDER — SERTRALINE HYDROCHLORIDE 25 MG/1
25 TABLET, FILM COATED ORAL DAILY
Qty: 90 TABLET | Refills: 3 | Status: SHIPPED | OUTPATIENT
Start: 2025-06-27

## 2025-07-01 DIAGNOSIS — E78.5 HYPERLIPIDEMIA, UNSPECIFIED HYPERLIPIDEMIA TYPE: ICD-10-CM

## 2025-07-03 LAB
CHOLEST SERPL-MCNC: 168 MG/DL
CHOLEST/HDLC SERPL: 2.1 (CALC)
HDLC SERPL-MCNC: 81 MG/DL
LDLC SERPL CALC-MCNC: 73 MG/DL (CALC)
NONHDLC SERPL-MCNC: 87 MG/DL (CALC)
TRIGL SERPL-MCNC: 62 MG/DL

## 2025-07-15 ENCOUNTER — APPOINTMENT (OUTPATIENT)
Dept: PRIMARY CARE | Facility: CLINIC | Age: 80
End: 2025-07-15
Payer: MEDICARE

## 2025-07-15 VITALS
BODY MASS INDEX: 22.13 KG/M2 | WEIGHT: 120 LBS | HEART RATE: 74 BPM | DIASTOLIC BLOOD PRESSURE: 76 MMHG | TEMPERATURE: 97.1 F | SYSTOLIC BLOOD PRESSURE: 120 MMHG | OXYGEN SATURATION: 97 %

## 2025-07-15 DIAGNOSIS — E03.9 ACQUIRED HYPOTHYROIDISM: ICD-10-CM

## 2025-07-15 DIAGNOSIS — E78.5 HYPERLIPIDEMIA, UNSPECIFIED HYPERLIPIDEMIA TYPE: ICD-10-CM

## 2025-07-15 DIAGNOSIS — K59.00 CONSTIPATION, UNSPECIFIED CONSTIPATION TYPE: ICD-10-CM

## 2025-07-15 DIAGNOSIS — F41.9 ANXIETY: ICD-10-CM

## 2025-07-15 DIAGNOSIS — R35.0 URINE FREQUENCY: ICD-10-CM

## 2025-07-15 DIAGNOSIS — I10 PRIMARY HYPERTENSION: Primary | ICD-10-CM

## 2025-07-15 DIAGNOSIS — R35.0 URINARY FREQUENCY: ICD-10-CM

## 2025-07-15 DIAGNOSIS — M80.00XD OSTEOPOROSIS WITH CURRENT PATHOLOGICAL FRACTURE WITH ROUTINE HEALING, UNSPECIFIED OSTEOPOROSIS TYPE, SUBSEQUENT ENCOUNTER: ICD-10-CM

## 2025-07-15 DIAGNOSIS — K21.9 GASTROESOPHAGEAL REFLUX DISEASE WITHOUT ESOPHAGITIS: ICD-10-CM

## 2025-07-15 PROBLEM — S32.020A COMPRESSION FRACTURE OF L2 LUMBAR VERTEBRA (MULTI): Status: RESOLVED | Noted: 2023-02-16 | Resolved: 2025-07-15

## 2025-07-15 PROBLEM — N83.299 OTHER OVARIAN CYST, UNSPECIFIED SIDE: Status: RESOLVED | Noted: 2023-07-19 | Resolved: 2025-07-15

## 2025-07-15 PROCEDURE — 1159F MED LIST DOCD IN RCRD: CPT | Performed by: INTERNAL MEDICINE

## 2025-07-15 PROCEDURE — 3078F DIAST BP <80 MM HG: CPT | Performed by: INTERNAL MEDICINE

## 2025-07-15 PROCEDURE — 1160F RVW MEDS BY RX/DR IN RCRD: CPT | Performed by: INTERNAL MEDICINE

## 2025-07-15 PROCEDURE — 1036F TOBACCO NON-USER: CPT | Performed by: INTERNAL MEDICINE

## 2025-07-15 PROCEDURE — 3074F SYST BP LT 130 MM HG: CPT | Performed by: INTERNAL MEDICINE

## 2025-07-15 PROCEDURE — 99214 OFFICE O/P EST MOD 30 MIN: CPT | Performed by: INTERNAL MEDICINE

## 2025-07-15 PROCEDURE — G2211 COMPLEX E/M VISIT ADD ON: HCPCS | Performed by: INTERNAL MEDICINE

## 2025-07-15 ASSESSMENT — PATIENT HEALTH QUESTIONNAIRE - PHQ9
7. TROUBLE CONCENTRATING ON THINGS, SUCH AS READING THE NEWSPAPER OR WATCHING TELEVISION: NOT AT ALL
6. FEELING BAD ABOUT YOURSELF - OR THAT YOU ARE A FAILURE OR HAVE LET YOURSELF OR YOUR FAMILY DOWN: NOT AT ALL
SUM OF ALL RESPONSES TO PHQ QUESTIONS 1-9: 4
1. LITTLE INTEREST OR PLEASURE IN DOING THINGS: SEVERAL DAYS
5. POOR APPETITE OR OVEREATING: SEVERAL DAYS
4. FEELING TIRED OR HAVING LITTLE ENERGY: SEVERAL DAYS
3. TROUBLE FALLING OR STAYING ASLEEP OR SLEEPING TOO MUCH: SEVERAL DAYS
8. MOVING OR SPEAKING SO SLOWLY THAT OTHER PEOPLE COULD HAVE NOTICED. OR THE OPPOSITE, BEING SO FIGETY OR RESTLESS THAT YOU HAVE BEEN MOVING AROUND A LOT MORE THAN USUAL: NOT AT ALL
9. THOUGHTS THAT YOU WOULD BE BETTER OFF DEAD, OR OF HURTING YOURSELF: NOT AT ALL
SUM OF ALL RESPONSES TO PHQ9 QUESTIONS 1 AND 2: 1
2. FEELING DOWN, DEPRESSED OR HOPELESS: NOT AT ALL

## 2025-07-15 ASSESSMENT — ENCOUNTER SYMPTOMS
HOARSE VOICE: 0
GLOBUS SENSATION: 0
CHOKING: 0
COUGH: 0
ABDOMINAL PAIN: 0
NAUSEA: 0
EARLY SATIETY: 0
BELCHING: 0
SORE THROAT: 0
HEARTBURN: 1
FATIGUE: 0
PALPITATIONS: 0
PSYCHIATRIC NEGATIVE: 1

## 2025-07-15 NOTE — PROGRESS NOTES
"Subjective   Patient ID: Zoraida Mejia is a 80 y.o. female who presents for Follow-up (Patient is here for a 6 week follow up. ), Tailbone Pain (Patient complains of tailbone pain. Patient denies any falls. Patient states that it only hurts when sitting in a chair.), URINE URGENCY (Patient is here for urine frequency. Patient is not sure if this could be from drinking too much water. ), and GERD (Patient complains of heart burn after eating some days. ).    Here  to follow up on medical problems HTN,HLD,Hypothyroid,tubular adenoma,GERD,h/o ovarian cyst,h/o kidney stone ,anxiety,osteoporosis with vertebral Fx. Had IV Reclast x 3,last one 6/2025,will be on drug Yasmani for 2 years,follow up on anxiety/depression,tolerating Sertraline 25 mg well,PHQ9 today was 4  She c/o tailbone pain when sitting down,she has occasional GERD symptoms,she has urinary frequency,no dysuria or blood in her urine.  She has been spending lots of time in the pool,sitting on a hard surface,tailbone pain only when sitting,no history of fall.  She sees Dr Farmer , cardiologist denies chest pain.  Here to go over her labs and echo  Her cough resolved after stopping Lisinopril,currently on Losartan, home BP at goal.  she has ,HLD, depression,anxiety, hypothyroid,osteoporosis,.taking her meds regularly,no side effects.She also has osteoporosis on IV Reclast and was covered, I put her order for this month.  She had history of multilevel degenerative disc disease lumbar spine and L2 vertebral compression fracture , no recurrence of her pain.  She refused mammogram(has breast implant,\"had bad experience when she had her mammogram in past\"  Last DEXA 11/2022,on IV Reclast for 2 years,6/2024  NO MAMMOGRAM ON FILE, patient refused  COLONOSCOPY: 2/22/2024   BONE DENSITY: 11/4/2024 ,on IV Reclast, last done 6/2025,had total of 3 infusions,now on drug Holiday  Cardiac calcium score 4/29/2025 score was 0,  She had eyelid surgery for ptosis early " 1/2024  Cardiac calcium score b4/25/2025 score was 0    GERD  She complains of heartburn. She reports no abdominal pain, no belching, no chest pain, no choking, no coughing, no dysphagia, no early satiety, no globus sensation, no hoarse voice, no nausea or no sore throat. This is a recurrent problem. The current episode started 1 to 4 weeks ago. The problem occurs occasionally. The problem has been unchanged. Pertinent negatives include no anemia, fatigue or melena. She has tried nothing for the symptoms.        Review of Systems   Constitutional:  Negative for fatigue.   HENT: Negative.  Negative for hoarse voice and sore throat.    Respiratory:  Negative for cough and choking.    Cardiovascular:  Negative for chest pain, palpitations and leg swelling.   Gastrointestinal:  Positive for heartburn. Negative for abdominal pain, dysphagia, melena and nausea.   Genitourinary:         As HPI.   Psychiatric/Behavioral: Negative.         Objective   /76 (BP Location: Left arm, Patient Position: Sitting, BP Cuff Size: Adult)   Pulse 74   Temp 36.2 °C (97.1 °F) (Temporal)   Wt 54.4 kg (120 lb)   SpO2 97%   BMI 22.13 kg/m²     Physical Exam  Vitals reviewed.   Constitutional:       General: She is not in acute distress.     Appearance: Normal appearance. She is normal weight.   HENT:      Head: Normocephalic and atraumatic.      Mouth/Throat:      Mouth: Mucous membranes are moist.   Eyes:      Extraocular Movements: Extraocular movements intact.      Pupils: Pupils are equal, round, and reactive to light.   Neck:      Vascular: No carotid bruit.   Cardiovascular:      Rate and Rhythm: Normal rate and regular rhythm.      Heart sounds: Normal heart sounds.   Pulmonary:      Effort: Pulmonary effort is normal.      Breath sounds: Normal breath sounds. No wheezing or rhonchi.   Abdominal:      General: Abdomen is flat. Bowel sounds are normal. There is no distension.      Palpations: Abdomen is soft.    Musculoskeletal:         General: Normal range of motion.      Cervical back: Normal range of motion and neck supple.      Right lower leg: No edema.      Left lower leg: No edema.      Comments: No pain with palpation over L spine or coccyc area,no swelling.   Lymphadenopathy:      Cervical: No cervical adenopathy.   Skin:     General: Skin is warm.   Neurological:      General: No focal deficit present.      Mental Status: She is alert and oriented to person, place, and time.      Cranial Nerves: No cranial nerve deficit.      Motor: No weakness.   Psychiatric:         Mood and Affect: Mood normal.         Behavior: Behavior normal.         Assessment/Plan   Problem List Items Addressed This Visit           ICD-10-CM    Anxiety F41.9    Continue Sertraline,PHQ9 today is 4.  Follow up in 4 months.         Gastroesophageal reflux disease without esophagitis K21.9    Avoid offending food,take TUMS as needed.         Hyperlipidemia E78.5    Hypothyroidism E03.9    Constipation K59.00    Osteoporosis M81.0    Urine frequency R35.0    Relevant Orders    Urinalysis with Reflex Microscopic    Urine Culture    Primary hypertension - Primary I10    Stable on Losartan .

## 2025-07-18 ENCOUNTER — APPOINTMENT (OUTPATIENT)
Dept: DERMATOLOGY | Facility: CLINIC | Age: 80
End: 2025-07-18
Payer: MEDICARE

## 2025-07-18 DIAGNOSIS — D22.39 FIBROUS PAPULE OF NOSE: ICD-10-CM

## 2025-07-18 DIAGNOSIS — L21.9 SEBORRHEIC DERMATITIS: Primary | ICD-10-CM

## 2025-07-18 DIAGNOSIS — L82.1 SEBORRHEIC KERATOSIS: ICD-10-CM

## 2025-07-18 PROCEDURE — 99213 OFFICE O/P EST LOW 20 MIN: CPT | Performed by: DERMATOLOGY

## 2025-07-18 PROCEDURE — 1036F TOBACCO NON-USER: CPT | Performed by: DERMATOLOGY

## 2025-07-18 PROCEDURE — 1159F MED LIST DOCD IN RCRD: CPT | Performed by: DERMATOLOGY

## 2025-07-18 ASSESSMENT — DERMATOLOGY PATIENT ASSESSMENT
ARE YOU TRYING TO GET PREGNANT: NO
ARE YOU AN ORGAN TRANSPLANT RECIPIENT: NO
HAVE YOU HAD OR DO YOU HAVE A STAPH INFECTION: NO
FOR PATIENTS COMING IN FOR A FOLLOW-UP VISIT - HAVE THERE BEEN ANY CHANGES IN YOUR HEALTH SINCE YOUR LAST VISIT: ALL IN MYCHART
ARE YOU ON BIRTH CONTROL: NO
DO YOU USE A TANNING BED: NO
DO YOU HAVE ANY NEW OR CHANGING LESIONS: YES

## 2025-07-18 ASSESSMENT — DERMATOLOGY QUALITY OF LIFE (QOL) ASSESSMENT
RATE HOW BOTHERED YOU ARE BY SYMPTOMS OF YOUR SKIN PROBLEM (EG, ITCHING, STINGING BURNING, HURTING OR SKIN IRRITATION): 2
RATE HOW EMOTIONALLY BOTHERED YOU ARE BY YOUR SKIN PROBLEM (FOR EXAMPLE, WORRY, EMBARRASSMENT, FRUSTRATION): 1
RATE HOW BOTHERED YOU ARE BY EFFECTS OF YOUR SKIN PROBLEMS ON YOUR ACTIVITIES (EG, GOING OUT, ACCOMPLISHING WHAT YOU WANT, WORK ACTIVITIES OR YOUR RELATIONSHIPS WITH OTHERS): 1
ARE THERE EXCLUSIONS OR EXCEPTIONS FOR THE QUALITY OF LIFE ASSESSMENT: NO
WHAT SINGLE SKIN CONDITION LISTED BELOW IS THE PATIENT ANSWERING THE QUALITY-OF-LIFE ASSESSMENT QUESTIONS ABOUT: DERMATITIS

## 2025-07-18 ASSESSMENT — ITCH NUMERIC RATING SCALE: HOW SEVERE IS YOUR ITCHING?: 0

## 2025-07-18 ASSESSMENT — PATIENT GLOBAL ASSESSMENT (PGA): PATIENT GLOBAL ASSESSMENT: PATIENT GLOBAL ASSESSMENT:  2 - MILD

## 2025-07-18 NOTE — PROGRESS NOTES
Subjective     Zoraida Mejia is a 80 y.o. female who presents for the following: Dermatitis (Pt here following up on Seborrheic Dermatitis. LV given Hydrocortisone 2.5% cream 2x daily x 2 weeks for current flare, then start Ketoconazole 2% cream for maintenance. Pt states overall it has improved however, dry skin keeps recurring./).     Intake Questions  Do you have any new or changing Lesions?: Yes  Where are these new or changing lesion(s) located?: face by nose  For patients coming in for a Follow-up Visit:  Have there been any changes in your health since your last visit?: all in mychart  Are you an organ transplant recipient?: No  Have you had or do you have a Staph Infection?: No  Do you use sunscreen?: None  Do you use a tanning bed?: No  Are you trying to get pregnant?: No  Are you on birth control?: No    Review of Systems:  No other skin or systemic complaints other than what is documented elsewhere in the note.    The following portions of the chart were reviewed this encounter and updated as appropriate:          Skin Cancer History  Biopsy Log Book  No skin cancers from Specimen Tracking.    Additional History      Specialty Problems          Dermatology Problems    Facial rhytids    Multiple nevi    Melanocytic nevi of other parts of face    Melanocytic nevi of right lower limb, including hip    Melanocytic nevi of trunk    Other melanin hyperpigmentation    Other seborrheic keratosis    Scar condition and fibrosis of skin    Hemangioma of skin    Lentiginosis        Objective   Well appearing patient in no apparent distress; mood and affect are within normal limits.    A focused skin examination was performed of the face. All findings within normal limits unless otherwise noted below.    Assessment/Plan   Skin Exam  1. SEBORRHEIC DERMATITIS  Left Alar Crease, Right Alar Crease  Clear today, slight hypopigmentation  The chronic and intermittently flaring nature of this skin condition was discussed  with patient today.     Reassured no evidence of malignancy clinically in the alar creases today    Continue the following treatment:  - ketoconazole 2% cream 1-2x daily for maintenance to prevent recurrence  - if flares can apply -hydrocortisone 2.5% cream 2x daily x 2 weeks     Patient in understanding and in agreement with treatment plan  Existing Treatments  - hydrocortisone 2.5 % cream - Apply 2x daily x 2 weeks when flaring  - ketoconazole (NIZOral) 2 % cream - Apply as a maintenance 2x daily to affected areas on the face  2. SEBORRHEIC KERATOSIS  Left Malar Cheek  Brown stuck on appearing papule  The benign nature of these skin lesions reviewed, reassure provided and no further treatment needed at this time.   These lesions can be removed, if symptomatic (itching, bleeding, rubbing on clothing, painful), otherwise removal is considered cosmetic.   3. FIBROUS PAPULE OF NOSE  Right Alar Crease  2mm skin colored papule  The benign nature of these skin lesions were reviewed, no treatment is necessary.   Please follow up for any new or pre-existing lesion that is changing in size, shape, color, becomes painful, tender, itches or bleed.

## 2025-07-18 NOTE — Clinical Note
The chronic and intermittently flaring nature of this skin condition was discussed with patient today.     Reassured no evidence of malignancy clinically in the alar creases today    Continue the following treatment:  - ketoconazole 2% cream 1-2x daily for maintenance to prevent recurrence  - if flares can apply -hydrocortisone 2.5% cream 2x daily x 2 weeks     Patient in understanding and in agreement with treatment plan

## 2025-07-19 LAB
APPEARANCE UR: CLEAR
BACTERIA UR CULT: NORMAL
BILIRUB UR QL STRIP: NEGATIVE
COLOR UR: YELLOW
GLUCOSE UR QL STRIP: NEGATIVE
HGB UR QL STRIP: NEGATIVE
KETONES UR QL STRIP: NEGATIVE
LEUKOCYTE ESTERASE UR QL STRIP: NEGATIVE
NITRITE UR QL STRIP: NEGATIVE
PH UR STRIP: 6 [PH] (ref 5–8)
PROT UR QL STRIP: NEGATIVE
SP GR UR STRIP: 1.01 (ref 1–1.03)

## 2025-08-06 ENCOUNTER — HOSPITAL ENCOUNTER (OUTPATIENT)
Dept: RADIOLOGY | Facility: CLINIC | Age: 80
Discharge: HOME | End: 2025-08-06
Payer: MEDICARE

## 2025-08-06 ENCOUNTER — HOSPITAL ENCOUNTER (OUTPATIENT)
Dept: RADIOLOGY | Facility: HOSPITAL | Age: 80
Discharge: HOME | End: 2025-08-06
Payer: MEDICARE

## 2025-08-06 ENCOUNTER — OFFICE VISIT (OUTPATIENT)
Dept: PRIMARY CARE | Facility: CLINIC | Age: 80
End: 2025-08-06
Payer: MEDICARE

## 2025-08-06 VITALS
OXYGEN SATURATION: 96 % | TEMPERATURE: 98.1 F | DIASTOLIC BLOOD PRESSURE: 74 MMHG | HEART RATE: 71 BPM | WEIGHT: 119.8 LBS | BODY MASS INDEX: 22.09 KG/M2 | SYSTOLIC BLOOD PRESSURE: 115 MMHG

## 2025-08-06 DIAGNOSIS — F41.9 ANXIETY: ICD-10-CM

## 2025-08-06 DIAGNOSIS — R22.41 MASS OF RIGHT LOWER LEG: ICD-10-CM

## 2025-08-06 DIAGNOSIS — M80.00XD OSTEOPOROSIS WITH CURRENT PATHOLOGICAL FRACTURE WITH ROUTINE HEALING, UNSPECIFIED OSTEOPOROSIS TYPE, SUBSEQUENT ENCOUNTER: ICD-10-CM

## 2025-08-06 DIAGNOSIS — K21.9 GASTROESOPHAGEAL REFLUX DISEASE WITHOUT ESOPHAGITIS: ICD-10-CM

## 2025-08-06 DIAGNOSIS — R22.41 MASS OF RIGHT LOWER LEG: Primary | ICD-10-CM

## 2025-08-06 DIAGNOSIS — E03.9 ACQUIRED HYPOTHYROIDISM: ICD-10-CM

## 2025-08-06 DIAGNOSIS — E78.5 HYPERLIPIDEMIA, UNSPECIFIED HYPERLIPIDEMIA TYPE: ICD-10-CM

## 2025-08-06 PROCEDURE — 1036F TOBACCO NON-USER: CPT | Performed by: INTERNAL MEDICINE

## 2025-08-06 PROCEDURE — 99214 OFFICE O/P EST MOD 30 MIN: CPT | Performed by: INTERNAL MEDICINE

## 2025-08-06 PROCEDURE — 73590 X-RAY EXAM OF LOWER LEG: CPT | Mod: RT

## 2025-08-06 PROCEDURE — 3074F SYST BP LT 130 MM HG: CPT | Performed by: INTERNAL MEDICINE

## 2025-08-06 PROCEDURE — 76882 US LMTD JT/FCL EVL NVASC XTR: CPT | Performed by: RADIOLOGY

## 2025-08-06 PROCEDURE — 73590 X-RAY EXAM OF LOWER LEG: CPT | Mod: RIGHT SIDE | Performed by: RADIOLOGY

## 2025-08-06 PROCEDURE — G2211 COMPLEX E/M VISIT ADD ON: HCPCS | Performed by: INTERNAL MEDICINE

## 2025-08-06 PROCEDURE — 1159F MED LIST DOCD IN RCRD: CPT | Performed by: INTERNAL MEDICINE

## 2025-08-06 PROCEDURE — 1160F RVW MEDS BY RX/DR IN RCRD: CPT | Performed by: INTERNAL MEDICINE

## 2025-08-06 PROCEDURE — 76882 US LMTD JT/FCL EVL NVASC XTR: CPT

## 2025-08-06 PROCEDURE — 3078F DIAST BP <80 MM HG: CPT | Performed by: INTERNAL MEDICINE

## 2025-08-06 ASSESSMENT — PATIENT HEALTH QUESTIONNAIRE - PHQ9
2. FEELING DOWN, DEPRESSED OR HOPELESS: NOT AT ALL
1. LITTLE INTEREST OR PLEASURE IN DOING THINGS: NOT AT ALL
SUM OF ALL RESPONSES TO PHQ9 QUESTIONS 1 AND 2: 0

## 2025-08-06 NOTE — PROGRESS NOTES
"Subjective   Patient ID: Zoraida Mejia is a 80 y.o. female who presents for Mass (Patient is here for a lump above the ankle on the right leg. Patient states that it is painful if you touch around the area. ).    Pt noted a painful \"lump\" right he does not recall any history of trauma lower leg for 2.5 weeks,no change in size ,but hurt with palpation. She tried cold compresses then tried heating pads,not any better.  But has been in and out of the pool, no history of immobilization.         Review of Systems   Musculoskeletal:         As HPI       Objective   /74 (BP Location: Left arm, Patient Position: Sitting, BP Cuff Size: Adult)   Pulse 71   Temp 36.7 °C (98.1 °F) (Temporal)   Wt 54.3 kg (119 lb 12.8 oz)   SpO2 96%   BMI 22.09 kg/m²     Physical Exam  Constitutional:       General: She is not in acute distress.    Musculoskeletal:      Right lower leg: No edema.      Left lower leg: No edema.      Comments: Right lower extremity revealed subcutaneous mass very close to the tibial bone medial to it, tender to palpation also there is tenderness when palpating the adjacent tibia bone, no fluctuation, no warmth or erythema.     Neurological:      Mental Status: She is alert.         Assessment/Plan   Problem List Items Addressed This Visit           ICD-10-CM    Anxiety F41.9    Continue Sertraline,  Follow up in 4 months.         Gastroesophageal reflux disease without esophagitis K21.9    Avoid offending food,take TUMS as needed.         Hyperlipidemia E78.5    Stable on Crestor.               Hypothyroidism E03.9    Stable on levothyroxine.               Osteoporosis M81.0    Mass of right lower leg - Primary R22.41    Will send patient for x-ray of her tibia-fibula and also ultrasound of the soft tissue to further evaluate.  Follow-up depend on above test result.         Relevant Orders    XR tibia fibula right 2 views (Completed)    US extremity nonvascular real time w image documentation limited " anatomic specific (Completed)

## 2025-08-07 ENCOUNTER — OFFICE VISIT (OUTPATIENT)
Dept: ORTHOPEDIC SURGERY | Facility: CLINIC | Age: 80
End: 2025-08-07
Payer: MEDICARE

## 2025-08-07 DIAGNOSIS — S80.11XA HEMATOMA OF RIGHT LOWER LEG: Primary | ICD-10-CM

## 2025-08-07 PROCEDURE — 99202 OFFICE O/P NEW SF 15 MIN: CPT | Performed by: FAMILY MEDICINE

## 2025-08-07 RX ORDER — NAPROXEN 250 MG/1
250 TABLET ORAL
Qty: 28 TABLET | Refills: 0 | Status: SHIPPED | OUTPATIENT
Start: 2025-08-07

## 2025-08-07 NOTE — PROGRESS NOTES
Acute Injury New Patient Visit  Assessment & Plan  Right lower leg hematoma  Resolving hematoma over anterior medial tibia, likely from minor trauma. No infection signs. Aspiration risks outweigh benefits.  At this time due to greater than 3 weeks since initial trauma per patient.  - Apply moist heat to aid hematoma breakdown.  - Perform gentle massage with skin cream to assist resolution.  - Prescribe two-week naproxen for swelling and inflammation.  - Schedule follow-up in two weeks.  - Return if symptoms worsen.  No orders of the defined types were placed in this encounter.    Procedures   At the conclusion of the visit there were no further questions by the patient/family regarding their plan of care.  Patient was instructed to call or return with any issues, questions, or concerns regarding their injury and/or treatment plan described above.    PHYSICAL EXAM:  General:  Patient is awake, alert, and oriented to person place and time.  Patient appears well nourished and well kept.  Affect Calm, Not Acutely Distressed.  Heent:  Normocephalic, Atraumatic, EOMI  Cardiovascular:  Hemodynamically stable.  Respiratory:  Normal respirations with unlabored breathing.  Neuro: Gross sensation intact to the lower extremities bilaterally.  Extremity: Right shin exam:  Physical Exam  EXTREMITIES: Right lower extremity warm, pink, well perfused, no cuts, wounds, sores, redness, or erythema. Small, minimally palpable fluid collection over posterior medial tibia measuring approximately 1.5 cm. No superficial redness, erythema, or obvious bruising.  MUSCULOSKELETAL: Gap is often tender, negative midfoot and distal metatarsal squeeze.    IMAGING:   Results  RADIOLOGY  Ultrasound: Small pocket of fluid near the bone, consistent with a resolving hematoma; no signs of infection.  US extremity nonvascular real time w image documentation limited anatomic specific  Narrative: Interpreted By:  João Flores,   STUDY:  US EXTREMITY  NONVASCULAR REAL TIME WITH IMAGE DOCUMENTATION LIMITED  ANATOMIC SPECIFIC;  8/6/2025 2:39 pm      INDICATION:  Signs/Symptoms:mass R lower leg.painfull.      ,R22.41 Localized swelling, mass and lump, right lower limb      COMPARISON:  None.      ACCESSION NUMBER(S):  [id number]      ORDERING CLINICIAN:  OMID SHETTY      TECHNIQUE:  Grayscale imaging and color Doppler were used in evaluating the area  of clinical concern, which is an edematous painful palpable lump just  cephalad to the medial malleolus.      FINDINGS:  In the area of clinical concern, there is a nonspecific fluid  collection in the subcutaneous soft tissues. This measures 1.4 x 0.5  x 1.6 cm. The fluid has diffuse low-level internal echoes. There is  no abnormal hypervascularity associated with it.      Impression: There is a small mildly complex fluid collection in the subcutaneous  soft tissues just above the medial malleolus, corresponding to the  area of clinical concern. Consider percutaneous needle aspiration to  exclude abscess.      MACRO:  None      Signed by: João Flores 8/6/2025 4:12 PM  Dictation workstation:   [id number]  XR tibia fibula right 2 views  Narrative: Interpreted By:  Zoltan Castillo,   STUDY:  XR TIBIA FIBULA RIGHT 2 VIEWS; ;  8/6/2025 12:10 pm      INDICATION:  Signs/Symptoms:painfull mass loer R tibia.      ,R22.41 Localized swelling, mass and lump, right lower limb      COMPARISON:  None.      ACCESSION NUMBER(S):  [id number]      ORDERING CLINICIAN:  OMID SHETTY      FINDINGS:  There is no fracture. There is no dislocation. There are no  degenerative changes. There is no lytic or sclerotic lesion. There is  no soft tissue abnormality seen.      Impression: No acute abnormality in the right tibia/fibula. If there is  persistent concern for a soft tissue mass MRI can be performed          MACRO:  None      Signed by: Zoltan Castillo 8/6/2025 12:18 PM  Dictation workstation:   [id number]      Patient ID:  Zoraida Mejia is a 80 y.o. female who presents for Abscess of the Right Lower Leg (Possible abscess).  History of Present Illness  Zoraida Mejia is an 80 year old female who presents with a soft tissue swelling on her right lower extremity. She was referred by her primary care doctor for further evaluation after an x-ray and ultrasound showed the swelling was close to the bone.    A few weeks ago, she noticed a soft tissue swelling on her right lower extremity, which she believes may have started after a minor scrape. There was no specific trauma to the area. Initially, there was some bruising, but it has since resolved. The swelling has not changed in size and is not red or hot to the touch.    The swelling is painful when pressed but does not cause significant discomfort otherwise. Recently, she has noticed some tenderness higher up on the leg, which was not present initially. No fevers, chills, or systemic symptoms are reported.    She is not on any blood thinners and is not currently taking any medications for this issue. She has tried icing the area without relief and applied hot compresses, which seemed to worsen the discomfort.    No fevers, chills, redness, or warmth in the area of the swelling. There is no pain when walking or putting weight on the leg.        This medical note was created with the assistance of artificial intelligence (AI) for documentation purposes. The content has been reviewed and confirmed by the healthcare provider for accuracy and completeness. Patient consented to the use of audio recording and use of AI during their visit.   08/07/25 at 10:55 AM - Cole C Budinsky, MD  Office:  [phone]

## 2025-08-08 NOTE — ASSESSMENT & PLAN NOTE
Avoid offending food,take TUMS as needed.  
Continue Sertraline,  Follow up in 4 months.  
Stable on Crestor.        
Stable on levothyroxine.        
Will send patient for x-ray of her tibia-fibula and also ultrasound of the soft tissue to further evaluate.  Follow-up depend on above test result.  
Cass

## 2025-08-19 ENCOUNTER — APPOINTMENT (OUTPATIENT)
Dept: PRIMARY CARE | Facility: CLINIC | Age: 80
End: 2025-08-19
Payer: MEDICARE

## 2025-08-25 ENCOUNTER — OFFICE VISIT (OUTPATIENT)
Dept: ORTHOPEDIC SURGERY | Facility: CLINIC | Age: 80
End: 2025-08-25
Payer: MEDICARE

## 2025-08-25 DIAGNOSIS — S80.11XA HEMATOMA OF RIGHT LOWER LEG: ICD-10-CM

## 2025-08-25 PROCEDURE — 99213 OFFICE O/P EST LOW 20 MIN: CPT | Performed by: ORTHOPAEDIC SURGERY

## 2025-09-03 ENCOUNTER — APPOINTMENT (OUTPATIENT)
Dept: RADIOLOGY | Facility: CLINIC | Age: 80
End: 2025-09-03
Payer: MEDICARE

## 2025-09-12 ENCOUNTER — APPOINTMENT (OUTPATIENT)
Dept: DERMATOLOGY | Facility: CLINIC | Age: 80
End: 2025-09-12

## 2025-09-18 ENCOUNTER — APPOINTMENT (OUTPATIENT)
Dept: RADIOLOGY | Facility: CLINIC | Age: 80
End: 2025-09-18
Payer: MEDICARE

## 2025-10-03 ENCOUNTER — APPOINTMENT (OUTPATIENT)
Dept: PRIMARY CARE | Facility: CLINIC | Age: 80
End: 2025-10-03
Payer: MEDICARE

## 2025-10-14 ENCOUNTER — APPOINTMENT (OUTPATIENT)
Dept: DERMATOLOGY | Facility: CLINIC | Age: 80
End: 2025-10-14

## 2026-03-25 ENCOUNTER — APPOINTMENT (OUTPATIENT)
Dept: DERMATOLOGY | Facility: CLINIC | Age: 81
End: 2026-03-25
Payer: MEDICARE

## 2026-04-06 ENCOUNTER — APPOINTMENT (OUTPATIENT)
Dept: CARDIOLOGY | Facility: CLINIC | Age: 81
End: 2026-04-06
Payer: MEDICARE